# Patient Record
Sex: MALE | Race: WHITE | NOT HISPANIC OR LATINO | Employment: FULL TIME | URBAN - METROPOLITAN AREA
[De-identification: names, ages, dates, MRNs, and addresses within clinical notes are randomized per-mention and may not be internally consistent; named-entity substitution may affect disease eponyms.]

---

## 2018-02-15 ENCOUNTER — CLINICAL SUPPORT (OUTPATIENT)
Dept: OCCUPATIONAL MEDICINE | Facility: CLINIC | Age: 58
End: 2018-02-15

## 2018-02-15 DIAGNOSIS — Z02.1 PRE-EMPLOYMENT EXAMINATION: Primary | ICD-10-CM

## 2018-02-15 PROCEDURE — 97750 PHYSICAL PERFORMANCE TEST: CPT | Mod: S$GLB,,, | Performed by: NURSE PRACTITIONER

## 2018-02-15 PROCEDURE — 99499 UNLISTED E&M SERVICE: CPT | Mod: S$GLB,,, | Performed by: NURSE PRACTITIONER

## 2018-02-15 PROCEDURE — 95832 PR HAND MUSCLE TEST,MANUAL: CPT | Mod: S$GLB,,, | Performed by: NURSE PRACTITIONER

## 2018-02-15 PROCEDURE — 80305 DRUG TEST PRSMV DIR OPT OBS: CPT | Mod: S$GLB,,, | Performed by: NURSE PRACTITIONER

## 2018-05-16 ENCOUNTER — TELEPHONE (OUTPATIENT)
Dept: OTOLARYNGOLOGY | Facility: CLINIC | Age: 58
End: 2018-05-16

## 2018-05-16 NOTE — TELEPHONE ENCOUNTER
----- Message from Kamron Patton sent at 5/16/2018  9:33 AM CDT -----  Contact: patient            Name of Who is Calling: Willie      What is the request in detail: Established patient is requesting to be seen this Friday only for a sinus infection.  Patient was offered appointment this Thursday but will not be available any day except this Friday.      Can the clinic reply by [facility]: no      What Number to Call Back [facility]: [phone]

## 2018-05-17 ENCOUNTER — CLINICAL SUPPORT (OUTPATIENT)
Dept: OTHER | Facility: CLINIC | Age: 58
End: 2018-05-17
Payer: COMMERCIAL

## 2018-05-17 DIAGNOSIS — Z00.8 HEALTH EXAMINATION IN POPULATION SURVEYS: Primary | ICD-10-CM

## 2018-05-17 PROCEDURE — 82947 ASSAY GLUCOSE BLOOD QUANT: CPT | Mod: QW,S$GLB,, | Performed by: INTERNAL MEDICINE

## 2018-05-17 PROCEDURE — 80061 LIPID PANEL: CPT | Mod: QW,S$GLB,, | Performed by: INTERNAL MEDICINE

## 2018-05-17 PROCEDURE — 99401 PREV MED CNSL INDIV APPRX 15: CPT | Mod: S$GLB,,, | Performed by: INTERNAL MEDICINE

## 2018-05-18 ENCOUNTER — OFFICE VISIT (OUTPATIENT)
Dept: OTOLARYNGOLOGY | Facility: CLINIC | Age: 58
End: 2018-05-18
Payer: COMMERCIAL

## 2018-05-18 VITALS
BODY MASS INDEX: 34.21 KG/M2 | DIASTOLIC BLOOD PRESSURE: 79 MMHG | WEIGHT: 218 LBS | HEIGHT: 67 IN | SYSTOLIC BLOOD PRESSURE: 118 MMHG

## 2018-05-18 VITALS
WEIGHT: 217 LBS | SYSTOLIC BLOOD PRESSURE: 137 MMHG | HEIGHT: 67 IN | HEART RATE: 63 BPM | DIASTOLIC BLOOD PRESSURE: 83 MMHG | BODY MASS INDEX: 34.06 KG/M2 | TEMPERATURE: 98 F

## 2018-05-18 DIAGNOSIS — J34.2 NASAL SEPTAL DEVIATION: ICD-10-CM

## 2018-05-18 DIAGNOSIS — R05.9 COUGH: ICD-10-CM

## 2018-05-18 DIAGNOSIS — J30.9 ALLERGIC RHINITIS, UNSPECIFIED SEASONALITY, UNSPECIFIED TRIGGER: ICD-10-CM

## 2018-05-18 DIAGNOSIS — J01.91 ACUTE RECURRENT SINUSITIS, UNSPECIFIED LOCATION: Primary | ICD-10-CM

## 2018-05-18 DIAGNOSIS — R51.9 NONINTRACTABLE EPISODIC HEADACHE, UNSPECIFIED HEADACHE TYPE: ICD-10-CM

## 2018-05-18 LAB
GLUCOSE SERPL-MCNC: 83 MG/DL (ref 60–140)
POC CHOLESTEROL, HDL: 52 MG/DL (ref 40–?)
POC CHOLESTEROL, LDL: 110 MG/DL (ref ?–160)
POC CHOLESTEROL, TOTAL: 203 MG/DL (ref ?–240)
POC GLUCOSE FASTING: ABNORMAL MG/DL (ref 60–110)
POC TOTAL CHOLESTEROL / HDL RATIO: 3.9 (ref ?–6)
POC TRIGLYCERIDES: 205 MG/DL (ref ?–160)

## 2018-05-18 PROCEDURE — 99214 OFFICE O/P EST MOD 30 MIN: CPT | Mod: S$GLB,,, | Performed by: SPECIALIST

## 2018-05-18 PROCEDURE — 3008F BODY MASS INDEX DOCD: CPT | Mod: CPTII,S$GLB,, | Performed by: SPECIALIST

## 2018-05-18 RX ORDER — SIMVASTATIN 20 MG/1
20 TABLET, FILM COATED ORAL NIGHTLY
Refills: 0 | COMMUNITY
Start: 2018-05-04

## 2018-05-18 RX ORDER — CETIRIZINE HYDROCHLORIDE 10 MG/1
10 TABLET ORAL DAILY
Refills: 0 | COMMUNITY
Start: 2018-02-14

## 2018-05-18 RX ORDER — ESCITALOPRAM OXALATE 10 MG/1
10 TABLET ORAL DAILY
Refills: 0 | COMMUNITY
Start: 2018-03-07 | End: 2019-04-03

## 2018-05-18 RX ORDER — CEFDINIR 300 MG/1
CAPSULE ORAL
Qty: 28 CAPSULE | Refills: 0 | Status: SHIPPED | OUTPATIENT
Start: 2018-05-18 | End: 2019-04-03 | Stop reason: ALTCHOICE

## 2018-05-18 RX ORDER — MONTELUKAST SODIUM 10 MG/1
10 TABLET ORAL NIGHTLY
Qty: 30 TABLET | Refills: 11 | Status: SHIPPED | OUTPATIENT
Start: 2018-05-18 | End: 2018-06-17

## 2018-05-18 RX ORDER — DIAZEPAM 10 MG/1
10 TABLET ORAL
Refills: 1 | COMMUNITY
Start: 2018-05-03

## 2018-05-18 RX ORDER — ETODOLAC 400 MG/1
400 TABLET, FILM COATED ORAL 2 TIMES DAILY PRN
Refills: 0 | COMMUNITY
Start: 2018-02-21 | End: 2019-09-06 | Stop reason: CLARIF

## 2018-05-18 RX ORDER — BRIMONIDINE TARTRATE 2 MG/ML
SOLUTION/ DROPS OPHTHALMIC
Refills: 0 | COMMUNITY
Start: 2018-05-04 | End: 2021-03-23

## 2018-05-18 RX ORDER — AMLODIPINE AND BENAZEPRIL HYDROCHLORIDE 5; 10 MG/1; MG/1
1 CAPSULE ORAL DAILY
Refills: 0 | COMMUNITY
Start: 2018-03-07 | End: 2021-03-23

## 2018-05-18 NOTE — PATIENT INSTRUCTIONS

## 2018-05-20 PROBLEM — J01.91 ACUTE RECURRENT SINUSITIS: Status: ACTIVE | Noted: 2018-05-20

## 2018-05-20 PROBLEM — J30.9 ALLERGIC RHINITIS: Status: ACTIVE | Noted: 2018-05-20

## 2018-05-20 PROBLEM — J34.2 NASAL SEPTAL DEVIATION: Status: ACTIVE | Noted: 2018-05-20

## 2018-05-20 NOTE — PROGRESS NOTES
Subjective:       Patient ID: Willie Roach is a 58 y.o. male.    Chief Complaint: Sinus Problem    The patient is having episodes of recurring sinusitis at least 3 to times per year if not more often.  In the last year he has had 4-6 episodes.  They typically resolve with antibiotic therapy but then recur within 4-6 weeks.  His symptom complex (postnasal drip and cough which produces yellow sputum.  He then sees his primary care physician where he received an antibiotic and steroid injection and symptoms resolved initially.  They typically return in 2-3 weeks.  Last antibiotic the patient took was Augmentin.  He is using Flonase and Xyzal with incomplete relief of symptoms.      Review of Systems   Constitutional: Negative for activity change, appetite change, chills, fatigue, fever and unexpected weight change.   HENT: Positive for congestion, postnasal drip, rhinorrhea, sinus pain, sinus pressure and sore throat. Negative for ear discharge, ear pain, facial swelling, hearing loss, mouth sores, sneezing, tinnitus, trouble swallowing and voice change.    Eyes: Negative for photophobia, pain, discharge, redness, itching and visual disturbance.   Respiratory: Positive for cough. Negative for apnea, choking, shortness of breath and wheezing.    Cardiovascular: Negative for chest pain and palpitations.   Gastrointestinal: Negative for abdominal distention, abdominal pain, nausea and vomiting.   Musculoskeletal: Negative for arthralgias, myalgias, neck pain and neck stiffness.   Skin: Negative.  Negative for color change, pallor and rash.   Allergic/Immunologic: Negative for environmental allergies, food allergies and immunocompromised state.   Neurological: Positive for headaches. Negative for dizziness, facial asymmetry, speech difficulty, weakness, light-headedness and numbness.   Hematological: Negative for adenopathy. Does not bruise/bleed easily.   Psychiatric/Behavioral: Negative for agitation, confusion,  decreased concentration and sleep disturbance.       Objective:      Physical Exam   Constitutional: He is oriented to person, place, and time. He appears well-developed and well-nourished. He is cooperative.   HENT:   Head: Normocephalic.   Right Ear: External ear and ear canal normal. Tympanic membrane is retracted.   Left Ear: External ear and ear canal normal. Tympanic membrane is retracted.   Nose: Mucosal edema (cyanotic, boggy inferior turbinates bilaterally), rhinorrhea (clear mucus bilaterally) and septal deviation (to the right anteriorly and left posteriorly) present.   Mouth/Throat: Uvula is midline. No oral lesions. Posterior oropharyngeal erythema (mild) present. No oropharyngeal exudate (thin pus from the nasopharynx).   Eyes: EOM and lids are normal. Pupils are equal, round, and reactive to light. Right eye exhibits no discharge and no exudate. Left eye exhibits no discharge and no exudate. Right conjunctiva is injected. Left conjunctiva is injected.   Neck: Trachea normal and normal range of motion. No muscular tenderness present. No tracheal deviation present. No thyroid mass and no thyromegaly present.   Cardiovascular: Normal rate, regular rhythm, normal heart sounds and normal pulses.    Pulmonary/Chest: Effort normal. No stridor. He has decreased breath sounds. He has no wheezes. He has no rhonchi. He has no rales.   Abdominal: Soft. Bowel sounds are normal. There is no tenderness.   Musculoskeletal: Normal range of motion.   Lymphadenopathy:        Head (right side): No submental, no submandibular, no preauricular, no posterior auricular and no occipital adenopathy present.        Head (left side): No submental, no submandibular, no preauricular, no posterior auricular and no occipital adenopathy present.     He has no cervical adenopathy.   Neurological: He is alert and oriented to person, place, and time. He has normal strength. No cranial nerve deficit or sensory deficit. Gait normal.    Skin: Skin is warm and dry. No petechiae and no rash noted. No cyanosis. Nails show no clubbing.   Psychiatric: He has a normal mood and affect. His speech is normal and behavior is normal. Judgment and thought content normal. Cognition and memory are normal.       Assessment:       1. Acute recurrent sinusitis, unspecified location    2. Allergic rhinitis, unspecified seasonality, unspecified trigger    3. Nasal septal deviation    4. Cough    5. Nonintractable episodic headache, unspecified headache type        Plan:       I am adding a nighttime dose of Singulair to the patient's daily Xyzal and Flonase.  I'm placing him on a different antibiotic and will recheck him in 2 weeks.  When he is clinically clear from the acute sinusitis I will order a CT scan of sinuses.

## 2018-06-01 ENCOUNTER — TELEPHONE (OUTPATIENT)
Dept: OTOLARYNGOLOGY | Facility: CLINIC | Age: 58
End: 2018-06-01

## 2018-06-01 NOTE — TELEPHONE ENCOUNTER
Attempted to wolf pt today about his schedule appointment with Dr. Nash. Left pt message to call Dr. Nash office to reschedule appt.

## 2018-08-15 ENCOUNTER — OFFICE VISIT (OUTPATIENT)
Dept: OCCUPATIONAL MEDICINE | Facility: CLINIC | Age: 58
End: 2018-08-15
Payer: COMMERCIAL

## 2018-08-15 VITALS
BODY MASS INDEX: 35.36 KG/M2 | TEMPERATURE: 98 F | SYSTOLIC BLOOD PRESSURE: 130 MMHG | DIASTOLIC BLOOD PRESSURE: 82 MMHG | WEIGHT: 220 LBS | HEIGHT: 66 IN

## 2018-08-15 DIAGNOSIS — S61.431A PUNCTURE WOUND OF RIGHT HAND, FOREIGN BODY PRESENCE UNSPECIFIED, INITIAL ENCOUNTER: Primary | ICD-10-CM

## 2018-08-15 DIAGNOSIS — Z02.83 ENCOUNTER FOR DRUG SCREENING: ICD-10-CM

## 2018-08-15 LAB — POC BREATH ALCOHOL: NEGATIVE

## 2018-08-15 PROCEDURE — 99203 OFFICE O/P NEW LOW 30 MIN: CPT | Mod: 25,S$GLB,, | Performed by: PHYSICIAN ASSISTANT

## 2018-08-15 PROCEDURE — 80305 DRUG TEST PRSMV DIR OPT OBS: CPT | Mod: S$GLB,,, | Performed by: PREVENTIVE MEDICINE

## 2018-08-15 PROCEDURE — 82075 ASSAY OF BREATH ETHANOL: CPT | Mod: S$GLB,,, | Performed by: PHYSICIAN ASSISTANT

## 2018-08-15 PROCEDURE — 90714 TD VACC NO PRESV 7 YRS+ IM: CPT | Mod: S$GLB,,, | Performed by: PHYSICIAN ASSISTANT

## 2018-08-15 PROCEDURE — 90471 IMMUNIZATION ADMIN: CPT | Mod: S$GLB,,, | Performed by: PHYSICIAN ASSISTANT

## 2018-08-15 NOTE — PROGRESS NOTES
Subjective:       Patient ID: Willie Roach is a 58 y.o. male.    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  His TD is not up to date.  CT        Review of Systems   Constitution: Negative for chills and fever.   Respiratory: Negative for shortness of breath.    Skin: Negative for poor wound healing.   Musculoskeletal: Negative for joint pain, muscle cramps and muscle weakness.       Objective:      Physical Exam   Constitutional: He is oriented to person, place, and time. He appears well-developed and well-nourished.   HENT:   Head: Normocephalic and atraumatic. Head is without abrasion, without contusion and without laceration.   Right Ear: External ear normal.   Left Ear: External ear normal.   Nose: Nose normal.   Mouth/Throat: Oropharynx is clear and moist.   Eyes: Conjunctivae, EOM and lids are normal. Pupils are equal, round, and reactive to light.   Neck: Trachea normal, full passive range of motion without pain and phonation normal. Neck supple.   Cardiovascular: Normal rate, regular rhythm and normal heart sounds.   Pulmonary/Chest: Effort normal and breath sounds normal. No stridor. No respiratory distress.   Musculoskeletal: Normal range of motion.        Right hand: He exhibits normal range of motion, no tenderness, normal capillary refill and no swelling. Normal sensation noted. Normal strength noted.        Hands:  Neurological: He is alert and oriented to person, place, and time.   Skin: Skin is warm, dry and intact. Capillary refill takes less than 2 seconds. No abrasion, no bruising, no burn, no ecchymosis, no laceration, no lesion and no rash noted. No erythema.   Psychiatric: He has a normal mood and affect. His speech is normal and behavior is normal. Judgment and thought content normal. Cognition and memory are normal.   Nursing note and vitals reviewed.      Assessment:        1. Puncture wound of right hand, foreign body presence unspecified, initial encounter    2. Encounter for drug screening        Plan:       Td given today. Wound cleaned with hibiclens. I did not feel or observe fb in the wound.          Restrictions: Regular Duty, Discharged from Occupational Health  Follow-up if symptoms worsen or fail to improve.

## 2018-08-15 NOTE — LETTER
[facility] [address]  [place] [address]  Phone: [phone]  Fax: [phone]    Pt Name: Willie Roach  Injury Date: 08/15/2018   Employee ID:  Date of First Treatment: 08/15/2018   Company: [employer]            Appointment Time: 03:15 PM Arrived:  3:30 PM CDT   Appointment Date: [unfilled] Time Out:1630 PM    Physician: Willie Riley PA-C        Office Treatment: Willie was seen today for puncture wound.    Diagnoses and all orders for this visit:  EXAM  DRUG AND ALCOHOL SCREEN  DISCHARGED TO REGULAR DUTY     Puncture wound of right hand, foreign body presence unspecified, initial encounter  Encounter for drug screening  Other orders          Restrictions: Regular Duty, Discharged from Occupational Health       Return Appointment: NONE

## 2018-08-20 PROBLEM — J01.91 ACUTE RECURRENT SINUSITIS: Status: RESOLVED | Noted: 2018-05-20 | Resolved: 2018-08-20

## 2018-10-04 ENCOUNTER — OFFICE VISIT (OUTPATIENT)
Dept: URGENT CARE | Facility: CLINIC | Age: 58
End: 2018-10-04
Payer: COMMERCIAL

## 2018-10-04 DIAGNOSIS — S61.441D: Primary | ICD-10-CM

## 2018-10-04 PROCEDURE — 99213 OFFICE O/P EST LOW 20 MIN: CPT | Mod: S$GLB,,, | Performed by: PREVENTIVE MEDICINE

## 2018-10-04 RX ORDER — DOXYCYCLINE HYCLATE 100 MG
100 TABLET ORAL 2 TIMES DAILY
Qty: 14 TABLET | Refills: 0 | Status: SHIPPED | OUTPATIENT
Start: 2018-10-04 | End: 2019-04-03

## 2018-10-04 NOTE — PROGRESS NOTES
Subjective:       Patient ID: Willie Roach is a 58 y.o. male.    Chief Complaint: Hand Pain    Follow up for RIGHT HAND puncture wound from 08/15/18.  CT      Hand Pain    The incident occurred more than 1 week ago. The incident occurred at work. The pain is present in the right hand. The pain is at a severity of 1/10. The pain is mild.     Review of Systems   Musculoskeletal: Positive for muscle cramps.       Objective:      Physical Exam   Constitutional: He is oriented to person, place, and time. He appears well-developed and well-nourished.   HENT:   Head: Normocephalic.   Eyes: Pupils are equal, round, and reactive to light.   Neck: Normal range of motion.   Cardiovascular: Normal rate.   Pulmonary/Chest: Effort normal.   Musculoskeletal: Normal range of motion.   Neurological: He is alert and oriented to person, place, and time.   Skin: Skin is warm and dry.        Puncture wound right hand with embedded foreign body noted about the jordan aspect of right hand. No erythema or discharge noted. Pulses good. Full range of motion of all fingers and thumb with no pain.   Psychiatric: He has a normal mood and affect.   Nursing note and vitals reviewed.      Assessment:       1. Puncture wound of right hand with foreign body, subsequent encounter        Plan:         Medications Ordered This Encounter   Medications    doxycycline (VIBRA-TABS) 100 MG tablet     Sig: Take 1 tablet (100 mg total) by mouth 2 (two) times daily.     Dispense:  14 tablet     Refill:  0     Patient Instructions: Keep dressing clean/dry/covered   Restrictions: Regular Duty  Follow-up in about 5 days (around 10/9/2018).

## 2018-10-04 NOTE — LETTER
[facility] [address]  [place] [address]  Phone: [phone]  Fax: [phone]    Pt Name: Willie Roach  Injury Date: 08/15/2018   Employee ID:  Date of Treatment: 10/04/2018   Company: [employer]      Appointment Time: 1:30 PM Arrived: 1330 PM   Provider: Evans Herrera MD Time Out:1605 PM     Office Treatment:   EXAM  REGULAR DUTY    1. Puncture wound of right hand with foreign body, subsequent encounter      Medications Ordered This Encounter   Medications    doxycycline (VIBRA-TABS) 100 MG tablet      Patient Instructions: Keep dressing clean/dry/covered      Restrictions: Regular Duty     Return Appointment: 10/8/18 @ 0830 AM IN [facility]  [address]

## 2018-10-04 NOTE — PROCEDURES
"Wound Debridement  Date/Time: 10/4/2018 3:54 PM  Performed by: Evans Herrera MD  Authorized by: Evans Herrera MD     Time out: Immediately prior to procedure a "time out" was called to verify the correct patient, procedure, equipment, support staff and site/side marked as required.    Consent Done?:  Yes (Verbal)  Local anesthesia used?: Yes    Anesthesia:  Local infiltration  Local anesthetic:  Lidocaine 2% without epinephrine  Anesthetic total (ml):  3    Wound Details:    Location:  Right hand    Type of Debridement:  Excisional       Length (cm):  2       Area (sq cm):  2       Width (cm):  1       Depth (cm):  1    Depth of debridement:  Subcutaneous tissue    Debridement - 1st Wound - Devitalized Tissue Debrided: Foreign body removed.    Instruments:  Forceps, Scissors and Blade    Bleeding:  None  Patient tolerance:  Patient tolerated the procedure well with no immediate complications      "

## 2018-10-08 ENCOUNTER — OFFICE VISIT (OUTPATIENT)
Dept: URGENT CARE | Facility: CLINIC | Age: 58
End: 2018-10-08
Payer: COMMERCIAL

## 2018-10-08 DIAGNOSIS — S61.441D: Primary | ICD-10-CM

## 2018-10-08 PROCEDURE — 99213 OFFICE O/P EST LOW 20 MIN: CPT | Mod: S$GLB,,, | Performed by: PREVENTIVE MEDICINE

## 2018-10-08 NOTE — PROGRESS NOTES
Subjective:       Patient ID: Willie Roach is a 58 y.o. male.    Chief Complaint: Wound Check    Follow-up visit from (DOI 08/15/18) of Right Hand Puncture. Pain Level today is 1/10, yesterday Mr Roach left his Right Hand undressed and was squeezing the wound site when clear liquid came out. He is still on Doxycycline 100mg. [facility]      Wound Check       Review of Systems   Constitution: Negative for chills and fever.   HENT: Negative for sore throat.    Eyes: Negative for blurred vision.   Cardiovascular: Negative for chest pain.   Respiratory: Negative for shortness of breath.    Skin: Negative for rash.   Musculoskeletal: Negative for back pain and joint pain.   Gastrointestinal: Negative for abdominal pain, diarrhea, nausea and vomiting.   Neurological: Negative for headaches.   Psychiatric/Behavioral: The patient is not nervous/anxious.    All other systems reviewed and are negative.      Objective:      Physical Exam   Constitutional: He is oriented to person, place, and time. He appears well-developed and well-nourished.   HENT:   Head: Normocephalic.   Eyes: Pupils are equal, round, and reactive to light.   Neck: Normal range of motion.   Cardiovascular: Normal rate.   Pulmonary/Chest: Effort normal.   Musculoskeletal: Normal range of motion.   Neurological: He is alert and oriented to person, place, and time.   Skin: Skin is warm and dry.        Puncture wound right hand healing with no sign of infection or discharge. Patient has mild pain with palpation of wound site. Full range of motion of all fingers with no pain..    Psychiatric: He has a normal mood and affect.   Nursing note and vitals reviewed.      Assessment:       1. Puncture wound of right hand with foreign body, subsequent encounter        Plan:            Patient Instructions: Keep dressing clean/dry/covered(Finish all oral antibiotics prescribed. Clean wound site with soap and water daily. )   Restrictions: Regular Duty, Discharged from  Occupational Health  Follow-up if symptoms worsen or fail to improve.

## 2018-10-08 NOTE — LETTER
[facility]  [address], [address]  [facility] [address]  Phone: [phone]  Fax: [phone]    Pt Name: Willie Tinsley Date: 08/15/2018   Employee ID: [id number] Date: 10/08/2018   Company: [employer]      Appointment Time: 8:30 AM Arrived: 8:32 AM   Provider: Evans Herrera MD Time Out: 9:25 AM     Office Treatment:   1. Puncture wound of right hand with foreign body, subsequent encounter          Patient Instructions: Keep dressing clean/dry/covered(Finish all oral antibiotics prescribed. Clean wound site with soap and water daily. )      Restrictions: NONE  Regular Duty   Patient is discharged from Occupational Health         [facility]

## 2019-03-11 DIAGNOSIS — S32.9XXA: Primary | ICD-10-CM

## 2019-03-14 DIAGNOSIS — J32.9 CHRONIC INFECTION OF SINUS: Primary | ICD-10-CM

## 2019-03-18 ENCOUNTER — HOSPITAL ENCOUNTER (OUTPATIENT)
Dept: RADIOLOGY | Facility: HOSPITAL | Age: 59
Discharge: HOME | End: 2019-03-18
Attending: OTOLARYNGOLOGY
Payer: COMMERCIAL

## 2019-03-18 DIAGNOSIS — J32.9 CHRONIC INFECTION OF SINUS: ICD-10-CM

## 2019-03-18 PROCEDURE — 70486 CT MAXILLOFACIAL W/O DYE: CPT | Mod: 26,,, | Performed by: RADIOLOGY

## 2019-03-18 PROCEDURE — 70486 CT SINUSES WITHOUT CONTRAST: ICD-10-PCS | Mod: 26,,, | Performed by: RADIOLOGY

## 2019-03-18 PROCEDURE — 70486 CT MAXILLOFACIAL W/O DYE: CPT | Mod: TC

## 2019-03-27 ENCOUNTER — OFFICE VISIT (OUTPATIENT)
Dept: PODIATRY | Facility: CLINIC | Age: 59
End: 2019-03-27
Payer: COMMERCIAL

## 2019-03-27 VITALS
HEIGHT: 66 IN | SYSTOLIC BLOOD PRESSURE: 120 MMHG | WEIGHT: 220 LBS | HEART RATE: 73 BPM | BODY MASS INDEX: 35.36 KG/M2 | DIASTOLIC BLOOD PRESSURE: 79 MMHG

## 2019-03-27 DIAGNOSIS — M79.671 FOOT PAIN, RIGHT: ICD-10-CM

## 2019-03-27 DIAGNOSIS — M20.11 HALLUX VALGUS OF RIGHT FOOT: Primary | ICD-10-CM

## 2019-03-27 PROCEDURE — 99999 PR PBB SHADOW E&M-EST. PATIENT-LVL III: CPT | Mod: PBBFAC,,, | Performed by: PODIATRIST

## 2019-03-27 PROCEDURE — 99999 PR PBB SHADOW E&M-EST. PATIENT-LVL III: ICD-10-PCS | Mod: PBBFAC,,, | Performed by: PODIATRIST

## 2019-03-27 PROCEDURE — 99203 PR OFFICE/OUTPT VISIT, NEW, LEVL III, 30-44 MIN: ICD-10-PCS | Mod: S$GLB,,, | Performed by: PODIATRIST

## 2019-03-27 PROCEDURE — 3008F BODY MASS INDEX DOCD: CPT | Mod: CPTII,S$GLB,, | Performed by: PODIATRIST

## 2019-03-27 PROCEDURE — 99203 OFFICE O/P NEW LOW 30 MIN: CPT | Mod: S$GLB,,, | Performed by: PODIATRIST

## 2019-03-27 PROCEDURE — 3008F PR BODY MASS INDEX (BMI) DOCUMENTED: ICD-10-PCS | Mod: CPTII,S$GLB,, | Performed by: PODIATRIST

## 2019-03-27 NOTE — LETTER
March 30, 2019      Lucretia Kinney Mai, MD  [address]  [place] [facility] [address]           [facility]  [address]  [place] [address]  Phone: [phone]          Patient: Willie Roach   MR Number: [id number]   YOB: 1960   Date of Visit: 3/27/2019       Dear Dr. Lucretia Kinney Mai:    Thank you for referring Willie Roach to me for evaluation. Attached you will find relevant portions of my assessment and plan of care.    If you have questions, please do not hesitate to call me. I look forward to following Willie Roach along with you.    Sincerely,    Tyler Perez, VANNESSA    Enclosure  CC:  No Recipients    If you would like to receive this communication electronically, please contact [email or web address] or [phone] to request more information on [employer] access.    For providers and/or their staff who would like to refer a patient to [facility], please contact us through our one-stop-shop provider referral line, [facility] , at [phone].    If you feel you have received this communication in error or would no longer like to receive these types of communications, please e-mail [email or web address]

## 2019-03-28 ENCOUNTER — TELEPHONE (OUTPATIENT)
Dept: PODIATRY | Facility: CLINIC | Age: 59
End: 2019-03-28

## 2019-03-28 NOTE — TELEPHONE ENCOUNTER
----- Message from Latia Hernandez sent at 3/28/2019  2:52 PM CDT -----  Contact: patient   Please call pt at [phone]    Patient is calling to get the date of his future procedure    Thank you

## 2019-03-30 NOTE — PROGRESS NOTES
Subjective:      Patient ID: Willie Roach is a 58 y.o. male. Pre-operative H&P.    Chief Complaint: Bunions (rt foot bunion surgery consult for surgery)    Willie is a 58 y.o. male who presents to the podiatry clinic  with complaint of  right foot pain. Onset of the symptoms was several years ago. Precipitating event: none known. Current symptoms include: ability to bear weight, but with some pain. Aggravating factors: walking. Symptoms have gradually worsened. Patient has had no prior foot problems. Evaluation to date: none. Treatment to date: avoidance of offending activity. Patients rates pain 8/10 on pain scale.        Review of Systems   Constitution: Negative for chills and fever.   HENT: Negative for congestion and tinnitus.    Eyes: Negative for double vision and visual disturbance.   Cardiovascular: Negative for chest pain and claudication.   Respiratory: Negative for hemoptysis and shortness of breath.    Endocrine: Negative for cold intolerance and heat intolerance.   Hematologic/Lymphatic: Negative for adenopathy and bleeding problem.   Skin: Negative for color change and nail changes.   Musculoskeletal: Negative for myalgias and stiffness.   Gastrointestinal: Negative for nausea and vomiting.   Genitourinary: Negative for dysuria and hematuria.   Neurological: Negative for numbness and paresthesias.   Psychiatric/Behavioral: Negative for altered mental status and suicidal ideas.   Allergic/Immunologic: Negative for environmental allergies and persistent infections.           Objective:      Physical Exam   Constitutional: He is oriented to person, place, and time. Vital signs are normal. He appears well-developed and well-nourished.   Cardiovascular:   Pulses:       Dorsalis pedis pulses are 2+ on the right side, and 2+ on the left side.        Posterior tibial pulses are 2+ on the right side, and 2+ on the left side.   Musculoskeletal:        Right foot: There is normal range of motion and no  deformity.        Left foot: There is normal range of motion and no deformity.   Inspection and palpation of the muscles joints and bones of both lower extremities reveal that muscle strength for the anterior, lateral, and posterior muscle groups and intrinsic muscle groups of the foot are all 5 over 5 symmetrical. Ankle, subtalar, midtarsal, and digital joint range of motion  are within normal limits, nonpainful, without crepitus or effusion. Patient exhibits a normal angle and base of gait. Palpation of the tendons reveal no defects.  Bunion deformity noted right foot with mild to moderate hypermobility at the 1st metatarsocuneiform joint as well as distal deformity of the 1st metatarsophalangeal joint. Tenderness with palpation medially as well as with range of motion.     Feet:   Right Foot:   Skin Integrity: Negative for skin breakdown or erythema.   Left Foot:   Skin Integrity: Negative for skin breakdown or erythema.   Neurological: He is oriented to person, place, and time. He has normal strength. No sensory deficit.   Reflex Scores:       Patellar reflexes are 2+ on the right side and 2+ on the left side.       Achilles reflexes are 2+ on the right side and 2+ on the left side.  Sharp, dull, light touch, vibratory, and proprioceptive sensation are intact bilaterally. Deep tendon reflexes to patellar and Achilles tendon are symmetrical, 2/4 bilaterally. No ankle clonus or Babinski reflexes noted bilaterally. Coordination is normal to both feet and lower extremities.   Skin: Skin is warm, dry and intact. No cyanosis. Nails show no clubbing.   Skin turgor is normal bilaterally. Skin texture is well hydrated to both lower extremities. No lesions or rashes or wounds appreciated bilaterally. Nail plates 1 through 5 bilaterally are within normal limits for length and thickness. No nail clubbing or incurvation noted.             Assessment:       Encounter Diagnosis   Name Primary?    Hallux valgus of right foot  Yes         Plan:       Willie was seen today for bunions.    Diagnoses and all orders for this visit:    Hallux valgus of right foot      I counseled the patient on his conditions, their implications and medical management.      The patient has decided to forego any further conservative treatment and opted for surgical intervention.  Alternative treatments, and benefits of surgery were discussed with the patient.  Risks and complications, including but not limited to: pain, swelling, numbness, infection, failure to achieve the stated goals, recurrence of problem, nerve and blood vessel damage, scar tissue formation, further need of surgery, loss of limb or life, was discussed in detail with the patient.  All questions asked were answered, and written informed consent was sign and received. The patient will undergo bunionectomy right foot consisting of possible Lapidus procedure/combination of proximal and distal osteotomies with Hiro.      .

## 2019-04-03 ENCOUNTER — ANESTHESIA EVENT (OUTPATIENT)
Dept: SURGERY | Facility: HOSPITAL | Age: 59
End: 2019-04-03
Payer: COMMERCIAL

## 2019-04-03 RX ORDER — FLAXSEED OIL 1000 MG
1 CAPSULE ORAL DAILY
COMMUNITY
End: 2021-03-24

## 2019-04-03 RX ORDER — UBIDECARENONE 30 MG
30 CAPSULE ORAL 2 TIMES DAILY
COMMUNITY
End: 2021-03-23

## 2019-04-03 NOTE — PRE-PROCEDURE INSTRUCTIONS
Preop instructions: NPO solids/ milk products after midnight and clears up to 2 hours before arrival (clear liquids are: water, apple juice, Gatorade & Jell-O, black coffee/no milk, cream or creamer), shower instructions, directions, leave all valuables at home, medication instructions for PM prior & am of procedure explained. Patient stated an understanding.   Patient denies any side effects or issues with anesthesia or sedation.   Patient does not know arrival time. Explained that this information comes from the surgeons office and if they have not heard from them by 3pm, to call office. Patient stated an understanding.

## 2019-04-03 NOTE — ANESTHESIA PREPROCEDURE EVALUATION
04/03/2019  Willie Roach is a 58 y.o., male.    Pre-operative evaluation for Procedure(s) (LRB):  BUNIONECTOMY (Right)    Willie Roach is a 58 y.o. male     Patient Active Problem List   Diagnosis    Allergic rhinitis    Nasal septal deviation       Review of patient's allergies indicates:   Allergen Reactions    Codeine Hives and Itching    Ginger Rash       No current facility-administered medications on file prior to encounter.      Current Outpatient Medications on File Prior to Encounter   Medication Sig Dispense Refill    amlodipine-benazepril 5-10 mg (LOTREL) 5-10 mg per capsule Take 1 capsule by mouth once daily.  0    B-complex with vitamin C (SUPER B COMPLEX-VITAMIN C ORAL) Take 1 tablet by mouth once daily.      cetirizine (ZYRTEC) 10 MG tablet Take 10 mg by mouth once daily.  0    co-enzyme Q-10 30 mg capsule Take 30 mg by mouth 2 (two) times daily.      flaxseed oil 1,000 mg Cap Take 1 capsule by mouth once daily.      simvastatin (ZOCOR) 20 MG tablet Take 20 mg by mouth every evening.  0    vit A/vit C/vit E/zinc/copper (PRESERVISION AREDS ORAL) Take by mouth once daily.      brimonidine 0.2% (ALPHAGAN) 0.2 % Drop INSTILL ONE DROP INTO EACH EYE TWICE A DAY  0    diazePAM (VALIUM) 10 MG Tab Take 10 mg by mouth nightly.  1    etodolac (LODINE) 400 MG tablet Take 400 mg by mouth 2 (two) times daily as needed.  0       Past Surgical History:   Procedure Laterality Date    BACK SURGERY      CARPAL TUNNEL RELEASE      ELBOW SURGERY      EYE SURGERY      KNEE ARTHROSCOPY         Social History     Socioeconomic History    Marital status:      Spouse name: Not on file    Number of children: Not on file    Years of education: Not on file    Highest education level: Not on file   Occupational History    Not on file   Social Needs    Financial resource strain: Not  on file    Food insecurity:     Worry: Not on file     Inability: Not on file    Transportation needs:     Medical: Not on file     Non-medical: Not on file   Tobacco Use    Smoking status: Not on file   Substance and Sexual Activity    Alcohol use: Not on file    Drug use: Not on file    Sexual activity: Not on file   Lifestyle    Physical activity:     Days per week: Not on file     Minutes per session: Not on file    Stress: Not on file   Relationships    Social connections:     Talks on phone: Not on file     Gets together: Not on file     Attends [employer] service: Not on file     Active member of club or organization: Not on file     Attends meetings of clubs or organizations: Not on file     Relationship status: Not on file   Other Topics Concern    Not on file   Social History Narrative    Not on file       Anesthesia Evaluation    I have reviewed the Patient Summary Reports.    I have reviewed the Nursing Notes.   I have reviewed the Medications.     Review of Systems  Anesthesia Hx:  No problems with previous Anesthesia    Cardiovascular:   Exercise tolerance: good Hypertension    Hepatic/GI:   Denies GERD.        Physical Exam  General:  Well nourished    Airway/Jaw/Neck:  Airway Findings: Mouth Opening: Normal Tongue: Normal  General Airway Assessment: Adult  Mallampati: II  Improves to II with phonation.  TM Distance: Normal, at least 6 cm      Dental:  Dental Findings:    Chest/Lungs:  Chest/Lungs Findings: Clear to auscultation, Normal Respiratory Rate     Heart/Vascular:  Heart Findings: Rate: Normal  Rhythm: Regular Rhythm        Mental Status:  Mental Status Findings:  Cooperative, Alert and Oriented         Anesthesia Plan  Type of Anesthesia, risks & benefits discussed:  Anesthesia Type:  general  Patient's Preference:   Intra-op Monitoring Plan: standard ASA monitors  Intra-op Monitoring Plan Comments:   Post Op Pain Control Plan: IV/PO Opioids PRN  Post Op Pain Control Plan Comments:    Induction:   IV  Beta Blocker:  Patient is not currently on a Beta-Blocker (No further documentation required).       Informed Consent: Patient understands risks and agrees with Anesthesia plan.  Questions answered. Anesthesia consent signed with patient.  ASA Score: 2     Day of Surgery Review of History & Physical:    H&P update referred to the provider.         Ready For Surgery From Anesthesia Perspective.

## 2019-04-04 ENCOUNTER — ANESTHESIA (OUTPATIENT)
Dept: SURGERY | Facility: HOSPITAL | Age: 59
End: 2019-04-04
Payer: COMMERCIAL

## 2019-04-04 ENCOUNTER — HOSPITAL ENCOUNTER (OUTPATIENT)
Facility: HOSPITAL | Age: 59
Discharge: HOME | End: 2019-04-04
Attending: PODIATRIST | Admitting: PODIATRIST
Payer: COMMERCIAL

## 2019-04-04 VITALS
TEMPERATURE: 98 F | WEIGHT: 220 LBS | OXYGEN SATURATION: 98 % | BODY MASS INDEX: 34.53 KG/M2 | HEIGHT: 67 IN | DIASTOLIC BLOOD PRESSURE: 70 MMHG | SYSTOLIC BLOOD PRESSURE: 126 MMHG | RESPIRATION RATE: 12 BRPM | HEART RATE: 76 BPM

## 2019-04-04 DIAGNOSIS — M65.171 OTHER INFECTIVE (TENO)SYNOVITIS, RIGHT ANKLE AND FOOT: ICD-10-CM

## 2019-04-04 DIAGNOSIS — M21.619 BUNION: ICD-10-CM

## 2019-04-04 DIAGNOSIS — G57.81 NERVE ENTRAPMENT OF LOWER LIMB, RIGHT: ICD-10-CM

## 2019-04-04 PROCEDURE — C1769 GUIDE WIRE: HCPCS | Performed by: PODIATRIST

## 2019-04-04 PROCEDURE — S0020 INJECTION, BUPIVICAINE HYDRO: HCPCS | Performed by: PODIATRIST

## 2019-04-04 PROCEDURE — D9220A PRA ANESTHESIA: Mod: CRNA,,, | Performed by: NURSE ANESTHETIST, CERTIFIED REGISTERED

## 2019-04-04 PROCEDURE — 28296 COR HLX VLGS DSTL MTAR OSTEO: CPT | Mod: T5,,, | Performed by: PODIATRIST

## 2019-04-04 PROCEDURE — 63600175 PHARM REV CODE 636 W HCPCS: Performed by: NURSE ANESTHETIST, CERTIFIED REGISTERED

## 2019-04-04 PROCEDURE — 36000707: Performed by: PODIATRIST

## 2019-04-04 PROCEDURE — 27201423 OPTIME MED/SURG SUP & DEVICES STERILE SUPPLY: Performed by: PODIATRIST

## 2019-04-04 PROCEDURE — D9220A PRA ANESTHESIA: Mod: ANES,,, | Performed by: ANESTHESIOLOGY

## 2019-04-04 PROCEDURE — 37000009 HC ANESTHESIA EA ADD 15 MINS: Performed by: PODIATRIST

## 2019-04-04 PROCEDURE — D9220A PRA ANESTHESIA: ICD-10-PCS | Mod: ANES,,, | Performed by: ANESTHESIOLOGY

## 2019-04-04 PROCEDURE — 28296 PR CORRECT BUNION, DISTAL METATARSAL OSTEOTOMY: ICD-10-PCS | Mod: T5,,, | Performed by: PODIATRIST

## 2019-04-04 PROCEDURE — 36000706: Performed by: PODIATRIST

## 2019-04-04 PROCEDURE — D9220A PRA ANESTHESIA: ICD-10-PCS | Mod: CRNA,,, | Performed by: NURSE ANESTHETIST, CERTIFIED REGISTERED

## 2019-04-04 PROCEDURE — 25000003 PHARM REV CODE 250: Performed by: PODIATRIST

## 2019-04-04 PROCEDURE — 71000015 HC POSTOP RECOV 1ST HR: Performed by: PODIATRIST

## 2019-04-04 PROCEDURE — 37000008 HC ANESTHESIA 1ST 15 MINUTES: Performed by: PODIATRIST

## 2019-04-04 PROCEDURE — 71000016 HC POSTOP RECOV ADDL HR: Performed by: PODIATRIST

## 2019-04-04 PROCEDURE — 63600175 PHARM REV CODE 636 W HCPCS: Performed by: ANESTHESIOLOGY

## 2019-04-04 PROCEDURE — 64708 REVISE ARM/LEG NERVE: CPT | Mod: 51,RT,, | Performed by: PODIATRIST

## 2019-04-04 PROCEDURE — C1713 ANCHOR/SCREW BN/BN,TIS/BN: HCPCS | Performed by: PODIATRIST

## 2019-04-04 PROCEDURE — 71000044 HC DOSC ROUTINE RECOVERY FIRST HOUR: Performed by: PODIATRIST

## 2019-04-04 PROCEDURE — 64708 PR NEUROPLASTY OTHER ARM/LEG NERVE,OPEN: ICD-10-PCS | Mod: 51,RT,, | Performed by: PODIATRIST

## 2019-04-04 RX ORDER — BUPIVACAINE HYDROCHLORIDE 5 MG/ML
INJECTION, SOLUTION EPIDURAL; INTRACAUDAL
Status: DISCONTINUED | OUTPATIENT
Start: 2019-04-04 | End: 2019-04-04 | Stop reason: HOSPADM

## 2019-04-04 RX ORDER — ONDANSETRON 2 MG/ML
4 INJECTION INTRAMUSCULAR; INTRAVENOUS DAILY PRN
Status: DISCONTINUED | OUTPATIENT
Start: 2019-04-04 | End: 2019-04-04 | Stop reason: HOSPADM

## 2019-04-04 RX ORDER — LIDOCAINE HYDROCHLORIDE 10 MG/ML
1 INJECTION, SOLUTION EPIDURAL; INFILTRATION; INTRACAUDAL; PERINEURAL ONCE
Status: DISCONTINUED | OUTPATIENT
Start: 2019-04-04 | End: 2019-04-04 | Stop reason: HOSPADM

## 2019-04-04 RX ORDER — SODIUM CHLORIDE 0.9 % (FLUSH) 0.9 %
3 SYRINGE (ML) INJECTION
Status: DISCONTINUED | OUTPATIENT
Start: 2019-04-04 | End: 2019-04-04 | Stop reason: HOSPADM

## 2019-04-04 RX ORDER — MEPERIDINE HYDROCHLORIDE 50 MG/1
50 TABLET ORAL EVERY 4 HOURS PRN
Qty: 30 TABLET | Refills: 0 | Status: SHIPPED | OUTPATIENT
Start: 2019-04-04 | End: 2019-04-29 | Stop reason: SDUPTHER

## 2019-04-04 RX ORDER — LIDOCAINE HYDROCHLORIDE 10 MG/ML
INJECTION, SOLUTION EPIDURAL; INFILTRATION; INTRACAUDAL; PERINEURAL
Status: DISCONTINUED | OUTPATIENT
Start: 2019-04-04 | End: 2019-04-04 | Stop reason: HOSPADM

## 2019-04-04 RX ORDER — FENTANYL CITRATE 50 UG/ML
25 INJECTION, SOLUTION INTRAMUSCULAR; INTRAVENOUS EVERY 5 MIN PRN
Status: DISCONTINUED | OUTPATIENT
Start: 2019-04-04 | End: 2019-04-04 | Stop reason: HOSPADM

## 2019-04-04 RX ORDER — FENTANYL CITRATE 50 UG/ML
INJECTION, SOLUTION INTRAMUSCULAR; INTRAVENOUS
Status: DISCONTINUED | OUTPATIENT
Start: 2019-04-04 | End: 2019-04-04

## 2019-04-04 RX ORDER — DEXAMETHASONE SODIUM PHOSPHATE 4 MG/ML
INJECTION, SOLUTION INTRA-ARTICULAR; INTRALESIONAL; INTRAMUSCULAR; INTRAVENOUS; SOFT TISSUE
Status: DISCONTINUED | OUTPATIENT
Start: 2019-04-04 | End: 2019-04-04

## 2019-04-04 RX ORDER — ONDANSETRON 2 MG/ML
INJECTION INTRAMUSCULAR; INTRAVENOUS
Status: DISCONTINUED | OUTPATIENT
Start: 2019-04-04 | End: 2019-04-04

## 2019-04-04 RX ORDER — MIDAZOLAM HYDROCHLORIDE 1 MG/ML
INJECTION, SOLUTION INTRAMUSCULAR; INTRAVENOUS
Status: DISCONTINUED | OUTPATIENT
Start: 2019-04-04 | End: 2019-04-04

## 2019-04-04 RX ORDER — MEPERIDINE HYDROCHLORIDE 50 MG/1
50 TABLET ORAL EVERY 6 HOURS PRN
Qty: 30 TABLET | Refills: 0 | Status: SHIPPED | OUTPATIENT
Start: 2019-04-04 | End: 2019-04-04 | Stop reason: CLARIF

## 2019-04-04 RX ORDER — PROPOFOL 10 MG/ML
VIAL (ML) INTRAVENOUS
Status: DISCONTINUED | OUTPATIENT
Start: 2019-04-04 | End: 2019-04-04

## 2019-04-04 RX ORDER — SODIUM CHLORIDE 9 MG/ML
INJECTION, SOLUTION INTRAVENOUS CONTINUOUS
Status: DISCONTINUED | OUTPATIENT
Start: 2019-04-04 | End: 2019-04-04 | Stop reason: HOSPADM

## 2019-04-04 RX ADMIN — FENTANYL CITRATE 25 MCG: 50 INJECTION, SOLUTION INTRAMUSCULAR; INTRAVENOUS at 07:04

## 2019-04-04 RX ADMIN — FENTANYL CITRATE 25 MCG: 50 INJECTION INTRAMUSCULAR; INTRAVENOUS at 10:04

## 2019-04-04 RX ADMIN — MIDAZOLAM HYDROCHLORIDE 2 MG: 1 INJECTION, SOLUTION INTRAMUSCULAR; INTRAVENOUS at 06:04

## 2019-04-04 RX ADMIN — ONDANSETRON 4 MG: 2 INJECTION INTRAMUSCULAR; INTRAVENOUS at 07:04

## 2019-04-04 RX ADMIN — MIDAZOLAM HYDROCHLORIDE 2 MG: 1 INJECTION, SOLUTION INTRAMUSCULAR; INTRAVENOUS at 07:04

## 2019-04-04 RX ADMIN — DEXAMETHASONE SODIUM PHOSPHATE 4 MG: 4 INJECTION, SOLUTION INTRAMUSCULAR; INTRAVENOUS at 07:04

## 2019-04-04 RX ADMIN — PROPOFOL 60 MG: 10 INJECTION, EMULSION INTRAVENOUS at 07:04

## 2019-04-04 RX ADMIN — SODIUM CHLORIDE: 0.9 INJECTION, SOLUTION INTRAVENOUS at 06:04

## 2019-04-04 RX ADMIN — PROPOFOL 20 MG: 10 INJECTION, EMULSION INTRAVENOUS at 07:04

## 2019-04-04 NOTE — OP NOTE
Operative Note       Surgery Date: 4/4/2019     Surgeon(s) and Role:     * Tyler Perez DPM - Primary     * Mario Groves MD - Resident - Assisting    Pre-op Diagnosis:  Hallux valgus of right foot [M20.11]    Post-op Diagnosis: Post-Op Diagnosis Codes:     * Hallux valgus of right foot [M20.11]      Synovitis right Extensor Hallucis Longus Tendon    Procedure(s) (LRB):  BUNIONECTOMY (Right)    Anesthesia: Local MAC    Procedure in Detail/Findings:  The patient was brought to the operating room on a stretcher and placed on the operating table in a supine position. Following the successful induction of MAC anesthesia an ankle tourniquet was placed on the right ankle.  When MAC anesthesia was achieved a block was given using 15ml of 1% lidocaine plain and 0.5% Bupivicain plain.  The right foot was prepped and draped in a sterile manner. Tourniquet was inflated to 250mmHg.     A longitudinal incision was made over the medial aspect of the 1st MP joint. The incision was then deepened through the subcutaneous tissue, using sharp and blunt dissection. Care was taken to identify and retract all vital neuro and vascular structures.  Soft tissue dissection yielded visualization of the 1st MPJ capsule and a capsulotomy was performed over the medial aspect of the 1st MPJ. All capsular structures were sharply freed from about the head of the dorsal and medial aspect of the 1st Metatarsal. The chondral surface of the first metatarsal head was intact throughout. A partal lateral release was completed from the plantar aspect of the first metatarsal head releasing the fibular sesamoidal ligament and a lateral capsular release was performed from the dorsal aspect of the metatarsal head.     The medial eminence was then resected with a sagittal saw preserving the medial sagittal groove. A k-wire was inserted just dorsal to the geometric center of the first metatarsal head serving as an axis guide.  An Khalif osteotomy was  performed from medial to lateral with apex distal. The capital fragment was then translated laterally reducing the intermetatarsal angle and HV angle. Guide wire for the cannulated screws were inserted for temporary fixation. Permanent fixation was accomplished using standard AO technique to place 3.0 headless cannulated screw.  Sagittal saw was used to smooth edges of the metatarsal head. Synovitic tissue was noted about the Ehl tyendon as well as the joint and this was excised sharply.    The wound was then flushed with sterile saline solution and the wound was closed using 4-0 vicryl and monocryl, and 4-0 nylon.  Tourniquet was let down.  The incision site was dressed with Xeroform, 4x4's, clover, and ace, and patient was placed in a Cam boot.    The patient was transferred to the recovery room with vital signs stable. Following a period of post op monitoring, the patient will be Discharged home  with the following post op instructions:   1. Keep dressing dry and intact until Podiatry follow up.   2.Weight bearing status: minimal weight bearing in Cam boot  3. All necessary prescriptions ordered and medical management will continue.   4. Contact Podiatry for all post op follow up care and if any problems arise.        Estimated Blood Loss: less than 5mL         Specimens (From admission, onward)    None        Implants:   Implant Name Type Inv. Item Serial No.  Lot No. LRB No. Used   3.0 headless screw      Right 1              Disposition: PACU - hemodynamically stable.           Condition: Good    Attestation:  I was present and scrubbed for the entire procedure.           Discharge Note    Admit Date: 4/4/2019    Attending Physician: Tyler Perez DPM     Discharge Physician: Tyler Perez DPM    Final Diagnosis: Post-Op Diagnosis Codes:     * Hallux valgus of right foot [M20.11]    Disposition: Home or Self Care    Patient Instructions:   Current Discharge Medication List      CONTINUE  "these medications which have NOT CHANGED    Details   amlodipine-benazepril 5-10 mg (LOTREL) 5-10 mg per capsule Take 1 capsule by mouth once daily.  Refills: 0      B-complex with vitamin C (SUPER B COMPLEX-VITAMIN C ORAL) Take 1 tablet by mouth once daily.      cetirizine (ZYRTEC) 10 MG tablet Take 10 mg by mouth once daily.  Refills: 0      co-enzyme Q-10 30 mg capsule Take 30 mg by mouth 2 (two) times daily.      diazePAM (VALIUM) 10 MG Tab Take 10 mg by mouth nightly.  Refills: 1      etodolac (LODINE) 400 MG tablet Take 400 mg by mouth 2 (two) times daily as needed.  Refills: 0      flaxseed oil 1,000 mg Cap Take 1 capsule by mouth once daily.      simvastatin (ZOCOR) 20 MG tablet Take 20 mg by mouth every evening.  Refills: 0      vit A/vit C/vit E/zinc/copper (PRESERVISION AREDS ORAL) Take by mouth once daily.      brimonidine 0.2% (ALPHAGAN) 0.2 % Drop INSTILL ONE DROP INTO EACH EYE TWICE A DAY  Refills: 0             Discharge Procedure Orders (must include Diet, Follow-up, Activity)   Discharge Procedure Orders (must include Diet, Follow-up, Activity)   HME - OTHER     Order Specific Question Answer Comments   Type of Equipment: knee scooter    Height: 5' 7" (1.702 m)    Weight: 99.8 kg (220 lb)    Vendor: [facility]    Expected Date of Delivery: 4/4/2019      Diet general     Call MD for:  extreme fatigue     Call MD for:  persistent dizziness or light-headedness     Call MD for:  hives     Call MD for:  redness, tenderness, or signs of infection (pain, swelling, redness, odor or green/yellow discharge around incision site)     Call MD for:  difficulty breathing, headache or visual disturbances     Call MD for:  severe uncontrolled pain     Call MD for:  persistent nausea and vomiting     Call MD for:  temperature >100.4     Leave dressing on - Keep it clean, dry, and intact until clinic visit     Weight bearing restrictions (specify)   Order Comments: Minimal weight bearing to right foot with short cam " boot        Discharge Date: No discharge date for patient encounter.

## 2019-04-04 NOTE — PROGRESS NOTES
Notified Dr. LUIS Ruiz that patient is complaining of pain and patient had nothing ordered for pain. Dr. Belkys Ruiz stated that she would place order for pain medicine. Will continue to monitor patient.

## 2019-04-04 NOTE — DISCHARGE INSTRUCTIONS
Osteotomy and Ligament or Tendon Repair (Bunion Surgery)  Osteotomy and ligament or tendon repair is a type of bunion surgery. A bunion is a bony bump (growth) at the base of your big toe. This growth can form when your big toe pushes against your next toe. A bunion can cause pain, swelling, redness, and other symptoms. During this surgery, bone is removed from your toe. Nearby tendons and ligaments are made shorter or longer as needed. This allows your big toe to line up (align) properly.    Preparing for surgery  Follow any instructions from your healthcare provider.  Tell your surgeon about any medicines you are taking. You may need to stop taking all or some of these before the procedure. This includes:  · All prescription medicines  · Over-the-counter medicines such as aspirin or ibuprofen  · Street drugs  · Herbs, vitamins, and other supplements  Follow any directions you are given for not eating or drinking before surgery.  The day of surgery  The surgery takes about 60 minutes. You will likely go home the same day.  Before the surgery begins  · An IV (intravenous) line is put into a vein in your arm or hand. This line gives you fluids and medicines.  · You may be given medicine to help you relax (sedation). To keep you free of pain during the surgery, you may have medicine to block the nerves in your foot. Or, you will be given general anesthesia. This puts you into a deep sleep.  During the surgery  · A cut (incision) is made on your foot to expose the bunion bump, and the tendons and ligaments around it. The tendons and ligaments that are tight are cut (released).  · The bunion bump is removed with a bone saw. Your big toe bone or the main bone in your foot is shortened and realigned. A pin, screw, or plate is used to hold your toe and foot bones together.  · The nearby tendons and ligaments may be tightened. If there is extra tissue, it is removed and the ends are stitched (sutured) together. The  incision in your skin is then closed with sutures. Your foot is bandaged.  After the surgery  Youll be taken to a recovery room. You may have medicines to manage pain. You may wear a brace, surgical shoe, or cast to protect your foot while it heals. The surgeon will tell you when you can go home. Have an adult family member or friend drive you.  Recovering at home  Once home, follow any instructions you are given. During your recovery:  · Take pain medicine exactly as directed.  · To prevent swelling, sit or lie with your leg raised on one or more pillows. Do this for the first 2 days.  · Follow your surgeon's instructions about putting weight on your foot after the surgery. You may need to use a walker, cane, or crutches for a time.  · You may wear a brace, surgical shoe, or cast for up to a month or longer. Care for this as instructed. Keep it dry by wrapping it in plastic bags when bathing.  · Avoid sports and other activities until your surgeon says its OK.  · Care for your incision as instructed.  · Don't drive until your surgeon says its OK.  Call your surgeon if you have any of the following:  · Chest pain or trouble breathing  · Fever of 100.4°F (38°C) or higher, or as directed by your surgeon  · Pain that isnt helped by medicine or rest  · Increased swelling not helped by raising or icing your foot  · Signs of infection at any incision site, such as increased redness or swelling, warmth, more pain, or bad-smelling drainage  · Bleeding through the bandages  · Symptoms of poor circulation, such as toes that look blue instead of pinkish  · Numbness that doesnt go away  · Any other signs or symptoms indicated by your surgeon   Follow-up  Keep all follow-up appointments with your surgeon. These are to check that you are healing well from the surgery. You may have X-rays to check how the bone is healing. Physical therapy, foot exercises, and other treatments may be discussed at follow-up visits. Full  recovery can take at least a few months.  Risks and possible complications include:  · Infection  · Bleeding  · Sensitivity at the incision site for months after the surgery  · Foot pain that doesnt go away after surgery  · Numbness in the foot  · Only partial relief of symptoms, or no relief of symptoms  · Return of the bunion  · Risks of anesthesia (the anesthesiologist will discuss these with you)  · Poor wound healing  · Breakage of screws or pins  · A lot of scarring   Date Last Reviewed: 7/28/2015  © 2532-1165 [employer]. [address] [place] [address], [place] [address]. All rights reserved. This information is not intended as a substitute for professional medical care. Always follow your healthcare professional's instructions.        Discharge Instructions: After Your Surgery  Youve just had surgery. During surgery, you were given medicine called anesthesia to keep you relaxed and free of pain. After surgery, you may have some pain or nausea. This is common. Here are some tips for feeling better and getting well after surgery.     Stay on schedule with your medicine.   Going home  Your healthcare provider will show you how to take care of yourself when you go home. He or she will also answer your questions. Have an adult family member or friend drive you home. For the first 24 hours after your surgery:  · Do not drive or use heavy equipment.  · Do not make important decisions or sign legal papers.  · Do not drink alcohol.  · Have someone stay with you, if needed. He or she can watch for problems and help keep you safe.  Be sure to go to all follow-up visits with your healthcare provider. And rest after your surgery for as long as your healthcare provider tells you to.  Coping with pain  If you have pain after surgery, pain medicine will help you feel better. Take it as told, before pain becomes severe. Also, ask your healthcare provider or pharmacist about other ways to control pain. This might be  with heat, ice, or relaxation. And follow any other instructions your surgeon or nurse gives you.  Tips for taking pain medicine  To get the best relief possible, remember these points:  · Pain medicines can upset your stomach. Taking them with a little food may help.  · Most pain relievers taken by mouth need at least 20 to 30 minutes to start to work.  · Taking medicine on a schedule can help you remember to take it. Try to time your medicine so that you can take it before starting an activity. This might be before you get dressed, go for a walk, or sit down for dinner.  · Constipation is a common side effect of pain medicines. Call your healthcare provider before taking any medicines such as laxatives or stool softeners to help ease constipation. Also ask if you should skip any foods. Drinking lots of fluids and eating foods such as fruits and vegetables that are high in fiber can also help. Remember, do not take laxatives unless your surgeon has prescribed them.  · Drinking alcohol and taking pain medicine can cause dizziness and slow your breathing. It can even be deadly. Do not drink alcohol while taking pain medicine.  · Pain medicine can make you react more slowly to things. Do not drive or run machinery while taking pain medicine.  Your healthcare provider may tell you to take acetaminophen to help ease your pain. Ask him or her how much you are supposed to take each day. Acetaminophen or other pain relievers may interact with your prescription medicines or other over-the-counter (OTC) medicines. Some prescription medicines have acetaminophen and other ingredients. Using both prescription and OTC acetaminophen for pain can cause you to overdose. Read the labels on your OTC medicines with care. This will help you to clearly know the list of ingredients, how much to take, and any warnings. It may also help you not take too much acetaminophen. If you have questions or do not understand the information, ask  your pharmacist or healthcare provider to explain it to you before you take the OTC medicine.  Managing nausea  Some people have an upset stomach after surgery. This is often because of anesthesia, pain, or pain medicine, or the stress of surgery. These tips will help you handle nausea and eat healthy foods as you get better. If you were on a special food plan before surgery, ask your healthcare provider if you should follow it while you get better. These tips may help:  · Do not push yourself to eat. Your body will tell you when to eat and how much.  · Start off with clear liquids and soup. They are easier to digest.  · Next try semi-solid foods, such as mashed potatoes, applesauce, and gelatin, as you feel ready.  · Slowly move to solid foods. Dont eat fatty, rich, or spicy foods at first.  · Do not force yourself to have 3 large meals a day. Instead eat smaller amounts more often.  · Take pain medicines with a small amount of solid food, such as crackers or toast, to avoid nausea.     Call your surgeon if  · You still have pain an hour after taking medicine. The medicine may not be strong enough.  · You feel too sleepy, dizzy, or groggy. The medicine may be too strong.  · You have side effects like nausea, vomiting, or skin changes, such as rash, itching, or hives.       If you have obstructive sleep apnea  You were given anesthesia medicine during surgery to keep you comfortable and free of pain. After surgery, you may have more apnea spells because of this medicine and other medicines you were given. The spells may last longer than usual.   At home:  · Keep using the continuous positive airway pressure (CPAP) device when you sleep. Unless your healthcare provider tells you not to, use it when you sleep, day or night. CPAP is a common device used to treat obstructive sleep apnea.  · Talk with your provider before taking any pain medicine, muscle relaxants, or sedatives. Your provider will tell you about the  possible dangers of taking these medicines.  Date Last Reviewed: 12/1/2016  © 5277-1246 [employer]. [address] [place] [address], [place] [address]. All rights reserved. This information is not intended as a substitute for professional medical care. Always follow your healthcare professional's instructions.

## 2019-04-04 NOTE — PLAN OF CARE
Patient received discharge instructions and prescriptions.  Patient verbalized understanding of all instructions given and all questions were addressed prior to patient's discharge.  Patient's vital signs are stable and within patient's baseline.  Patient tolerated clear liquids PO.  Patient voided 550 mL of clear yellow urine without difficulty in post-op.  Patient denies pain.  Patient denies nausea and vomiting at this time.  Patient meets all criteria for discharge at this time.  All required consents present in patient's chart upon patient's discharge.

## 2019-04-04 NOTE — TRANSFER OF CARE
"Anesthesia Transfer of Care Note    Patient: Willie Roach    Procedure(s) Performed: Procedure(s) (LRB):  BUNIONECTOMY (Right)    Patient location: [facility]    Anesthesia Type: general    Transport from OR: Transported from OR on room air with adequate spontaneous ventilation    Post pain: adequate analgesia    Post assessment: no apparent anesthetic complications and tolerated procedure well    Post vital signs: stable    Level of consciousness: awake    Nausea/Vomiting: no nausea/vomiting    Complications: none    Transfer of care protocol was followed      Last vitals:   Visit Vitals  /77 (BP Location: Left arm, Patient Position: Lying)   Pulse 65   Temp 36.6 °C (97.8 °F) (Oral)   Resp 18   Ht 5' 7" (1.702 m)   Wt 99.8 kg (220 lb)   SpO2 100%   BMI 34.46 kg/m²     "

## 2019-04-04 NOTE — PLAN OF CARE
Patient arrived from OR.  Patient stable.  Report received at this time from JOHNNY Field RN and JENNIFER Payne CRNA.  Assumed care of patient at this time.

## 2019-04-04 NOTE — LETTER
April 8, 2019         [address]  [facility] [address]  Phone: [phone]  Fax: [phone]       Patient: Willie Roach   YOB: 1960  Date of Visit: 04/08/2019    To Whom It May Concern:    BLU Roach  was at [facility] on 4/4/19. He may return to work in 6 weeks with no restrictions secondary to foot surgery. If you have any questions or concerns, or if I can be of further assistance, please do not hesitate to contact me.    Sincerely,    Tyler Perez DPM

## 2019-04-04 NOTE — ANESTHESIA POSTPROCEDURE EVALUATION
Anesthesia Post Evaluation    Patient: Willie Roach    Procedure(s) Performed: Procedure(s) (LRB):  BUNIONECTOMY (Right)    Final Anesthesia Type: general  Patient location during evaluation: [facility]  Patient participation: Yes- Able to Participate  Level of consciousness: awake and alert  Post-procedure vital signs: reviewed and stable  Pain management: adequate  Airway patency: patent  PONV status at discharge: No PONV  Anesthetic complications: no      Cardiovascular status: blood pressure returned to baseline  Respiratory status: unassisted  Hydration status: euvolemic  Follow-up not needed.          Vitals Value Taken Time   /70 4/4/2019 10:31 AM   Temp 36.3 °C (97.3 °F) 4/4/2019  8:05 AM   Pulse 79 4/4/2019 10:37 AM   Resp 12 4/4/2019 10:15 AM   SpO2 97 % 4/4/2019 10:37 AM   Vitals shown include unvalidated device data.      No case tracking events are documented in the log.      Pain/Patrick Score: Pain Rating Prior to Med Admin: 6 (4/4/2019 10:20 AM)  Patrick Score: 9 (4/4/2019  8:20 AM)

## 2019-04-04 NOTE — PROGRESS NOTES
Paged Dr. JEANETH Groves to inform him that patient stated that PAWAN Groves would prescribe pain medication, but medication has not been prescribed as of yet. Dr. Groves stated that he ckecked with Dr. Perez and a Rx for meperidine has been sent to patient's pharmacy in [place]. Informed patient and patient verbalized understanding.

## 2019-04-08 ENCOUNTER — TELEPHONE (OUTPATIENT)
Dept: PODIATRY | Facility: CLINIC | Age: 59
End: 2019-04-08

## 2019-04-08 NOTE — TELEPHONE ENCOUNTER
----- Message from Latia Hernandez sent at 4/8/2019  8:42 AM CDT -----  Contact: patient   Please call pt at [phone]    Patient is requesting a letter stating his re-evaluation is 30 days after surgery immediately    Send to [facility] Dept Attn Chief Cole fax# [phone]    Thank you

## 2019-04-08 NOTE — TELEPHONE ENCOUNTER
Spoke to patient and he states that he needs letter from Dr. Perez stating that he is going to be out for six weeks after his surgery and will return to work then, if possible to be fax to [phone] Att [facility] Dept. Chief Cole. Advice Dr. Perez to please write the note at once to fax it.

## 2019-04-10 ENCOUNTER — OFFICE VISIT (OUTPATIENT)
Dept: PODIATRY | Facility: CLINIC | Age: 59
End: 2019-04-10
Payer: COMMERCIAL

## 2019-04-10 VITALS
BODY MASS INDEX: 34.53 KG/M2 | DIASTOLIC BLOOD PRESSURE: 77 MMHG | HEIGHT: 67 IN | WEIGHT: 220 LBS | HEART RATE: 65 BPM | SYSTOLIC BLOOD PRESSURE: 122 MMHG

## 2019-04-10 DIAGNOSIS — M20.11 HALLUX VALGUS OF RIGHT FOOT: Primary | ICD-10-CM

## 2019-04-10 PROCEDURE — 99024 POSTOP FOLLOW-UP VISIT: CPT | Mod: S$GLB,,, | Performed by: PODIATRIST

## 2019-04-10 PROCEDURE — 99999 PR PBB SHADOW E&M-EST. PATIENT-LVL III: CPT | Mod: PBBFAC,,, | Performed by: PODIATRIST

## 2019-04-10 PROCEDURE — 99999 PR PBB SHADOW E&M-EST. PATIENT-LVL III: ICD-10-PCS | Mod: PBBFAC,,, | Performed by: PODIATRIST

## 2019-04-10 PROCEDURE — 99024 PR POST-OP FOLLOW-UP VISIT: ICD-10-PCS | Mod: S$GLB,,, | Performed by: PODIATRIST

## 2019-04-11 NOTE — PROGRESS NOTES
Willie presents 6 days status post bunionectomy with nerve decompression right foot.  He is doing very well with minimal discomfort.  He is no longer taking pain medication at this point however sometimes at night.  He has been keeping the area clean and dry.  He presents with the bandages intact.  Incision is healing well with no signs of infection or dehiscence.  Range of motion exercises discussed with the patient. Good range of motion noted for 6 days postop.  Joint is in very good alignment.  Follow up in 1 week.  Continue to keep the area clean and dry with the bandages intact and walking boot and/or knee scooter.

## 2019-04-18 ENCOUNTER — OFFICE VISIT (OUTPATIENT)
Dept: PODIATRY | Facility: CLINIC | Age: 59
End: 2019-04-18
Payer: COMMERCIAL

## 2019-04-18 VITALS
TEMPERATURE: 98 F | DIASTOLIC BLOOD PRESSURE: 81 MMHG | WEIGHT: 220 LBS | RESPIRATION RATE: 19 BRPM | BODY MASS INDEX: 34.53 KG/M2 | HEART RATE: 94 BPM | SYSTOLIC BLOOD PRESSURE: 153 MMHG | HEIGHT: 67 IN

## 2019-04-18 DIAGNOSIS — M20.11 HALLUX VALGUS OF RIGHT FOOT: Primary | ICD-10-CM

## 2019-04-18 PROCEDURE — 99999 PR PBB SHADOW E&M-EST. PATIENT-LVL III: CPT | Mod: PBBFAC,,, | Performed by: PODIATRIST

## 2019-04-18 PROCEDURE — 99024 POSTOP FOLLOW-UP VISIT: CPT | Mod: S$GLB,,, | Performed by: PODIATRIST

## 2019-04-18 PROCEDURE — 99999 PR PBB SHADOW E&M-EST. PATIENT-LVL III: ICD-10-PCS | Mod: PBBFAC,,, | Performed by: PODIATRIST

## 2019-04-18 PROCEDURE — 99024 PR POST-OP FOLLOW-UP VISIT: ICD-10-PCS | Mod: S$GLB,,, | Performed by: PODIATRIST

## 2019-04-25 ENCOUNTER — OFFICE VISIT (OUTPATIENT)
Dept: PODIATRY | Facility: CLINIC | Age: 59
End: 2019-04-25
Payer: COMMERCIAL

## 2019-04-25 VITALS
HEART RATE: 78 BPM | WEIGHT: 220 LBS | BODY MASS INDEX: 34.53 KG/M2 | SYSTOLIC BLOOD PRESSURE: 127 MMHG | HEIGHT: 67 IN | DIASTOLIC BLOOD PRESSURE: 84 MMHG

## 2019-04-25 DIAGNOSIS — M20.10 VALGUS DEFORMITY OF GREAT TOE, UNSPECIFIED LATERALITY: Primary | ICD-10-CM

## 2019-04-25 PROCEDURE — 99024 PR POST-OP FOLLOW-UP VISIT: ICD-10-PCS | Mod: S$GLB,,, | Performed by: PODIATRIST

## 2019-04-25 PROCEDURE — 99999 PR PBB SHADOW E&M-EST. PATIENT-LVL III: CPT | Mod: PBBFAC,,, | Performed by: PODIATRIST

## 2019-04-25 PROCEDURE — 99999 PR PBB SHADOW E&M-EST. PATIENT-LVL III: ICD-10-PCS | Mod: PBBFAC,,, | Performed by: PODIATRIST

## 2019-04-25 PROCEDURE — 99024 POSTOP FOLLOW-UP VISIT: CPT | Mod: S$GLB,,, | Performed by: PODIATRIST

## 2019-04-28 NOTE — PROGRESS NOTES
Willie presents 3 weeks status post bunionectomy with nerve decompression right foot.  He is doing very well with minimal discomfort.  He is no longer taking pain medication at this point however sometimes at night.  He has been keeping the area clean and dry.  He presents with the bandages intact.  Incision is healing well with no signs of infection or dehiscence.  Range of motion exercises discussed with the patient. Good range of motion noted for 6 days postop.  Joint is in very good alignment.  Follow up in 1 week.  Continue to keep the area clean and dry with the bandages intact and walking boot and/or knee scooter.Sutures removed under asceptic conditions, dry sterile bandage applied. Patient is cleared to begin light bathing of operative site.  No oils, lotions, or ointments to incision for two weeks

## 2019-04-29 ENCOUNTER — TELEPHONE (OUTPATIENT)
Dept: PODIATRY | Facility: CLINIC | Age: 59
End: 2019-04-29

## 2019-04-29 DIAGNOSIS — G89.18 POST-OPERATIVE PAIN: Primary | ICD-10-CM

## 2019-04-29 RX ORDER — MEPERIDINE HYDROCHLORIDE 50 MG/1
50 TABLET ORAL EVERY 4 HOURS PRN
Qty: 30 TABLET | Refills: 0 | Status: SHIPPED | OUTPATIENT
Start: 2019-04-29 | End: 2019-04-29

## 2019-04-29 NOTE — TELEPHONE ENCOUNTER
----- Message from Kayla Hardwick sent at 4/29/2019  2:50 PM CDT -----  Contact: [email or web address]  Type:  RX Refill Request    Who Called:  Patient  Refill or New Rx:  Refill  RX Name and Strength:  Demerol  How is the patient currently taking it? (ex. 1XDay):    Is this a 30 day or 90 day RX:  30  Preferred Pharmacy with phone number:  [facility]  Local or Mail Order:  Local  Ordering Provider:    Best Call Back Number:  [phone]  Additional Information:

## 2019-04-30 ENCOUNTER — TELEPHONE (OUTPATIENT)
Dept: PODIATRY | Facility: CLINIC | Age: 59
End: 2019-04-30

## 2019-05-09 ENCOUNTER — OFFICE VISIT (OUTPATIENT)
Dept: PODIATRY | Facility: CLINIC | Age: 59
End: 2019-05-09
Payer: COMMERCIAL

## 2019-05-09 VITALS
DIASTOLIC BLOOD PRESSURE: 73 MMHG | HEART RATE: 92 BPM | WEIGHT: 220 LBS | HEIGHT: 67 IN | BODY MASS INDEX: 34.53 KG/M2 | SYSTOLIC BLOOD PRESSURE: 121 MMHG

## 2019-05-09 DIAGNOSIS — M20.11 HALLUX VALGUS OF RIGHT FOOT: Primary | ICD-10-CM

## 2019-05-09 PROCEDURE — 99024 PR POST-OP FOLLOW-UP VISIT: ICD-10-PCS | Mod: S$GLB,,, | Performed by: PODIATRIST

## 2019-05-09 PROCEDURE — 99024 POSTOP FOLLOW-UP VISIT: CPT | Mod: S$GLB,,, | Performed by: PODIATRIST

## 2019-05-09 PROCEDURE — 99999 PR PBB SHADOW E&M-EST. PATIENT-LVL III: CPT | Mod: PBBFAC,,, | Performed by: PODIATRIST

## 2019-05-09 PROCEDURE — 99999 PR PBB SHADOW E&M-EST. PATIENT-LVL III: ICD-10-PCS | Mod: PBBFAC,,, | Performed by: PODIATRIST

## 2019-05-10 NOTE — H&P (VIEW-ONLY)
Subjective:      Patient ID: Willie Roach is a 59 y.o. male. Pre-operative H&P.    Chief Complaint: Valgus deformity of great toe, unspecified laterality ( rt foot)    Willie is a 59 y.o. male who presents to the podiatry clinic  with complaint of  right foot pain. Onset of the symptoms was several years ago. Precipitating event: none known. Current symptoms include: ability to bear weight, but with some pain. Aggravating factors: walking. Symptoms have gradually worsened. Patient has had no prior foot problems. Evaluation to date: none. Treatment to date: avoidance of offending activity. Patients rates pain 8/10 on pain scale.        Review of Systems   Constitution: Negative for chills and fever.   HENT: Negative for congestion and tinnitus.    Eyes: Negative for double vision and visual disturbance.   Cardiovascular: Negative for chest pain and claudication.   Respiratory: Negative for hemoptysis and shortness of breath.    Endocrine: Negative for cold intolerance and heat intolerance.   Hematologic/Lymphatic: Negative for adenopathy and bleeding problem.   Skin: Negative for color change and nail changes.   Musculoskeletal: Negative for myalgias and stiffness.   Gastrointestinal: Negative for nausea and vomiting.   Genitourinary: Negative for dysuria and hematuria.   Neurological: Negative for numbness and paresthesias.   Psychiatric/Behavioral: Negative for altered mental status and suicidal ideas.   Allergic/Immunologic: Negative for environmental allergies and persistent infections.           Objective:      Physical Exam   Constitutional: He is oriented to person, place, and time. Vital signs are normal. He appears well-developed and well-nourished.   Cardiovascular:   Pulses:       Dorsalis pedis pulses are 2+ on the right side, and 2+ on the left side.        Posterior tibial pulses are 2+ on the right side, and 2+ on the left side.   Musculoskeletal:        Right foot: There is normal range of  motion and no deformity.        Left foot: There is normal range of motion and no deformity.   Inspection and palpation of the muscles joints and bones of both lower extremities reveal that muscle strength for the anterior, lateral, and posterior muscle groups and intrinsic muscle groups of the foot are all 5 over 5 symmetrical. Ankle, subtalar, midtarsal, and digital joint range of motion  are within normal limits, nonpainful, without crepitus or effusion. Patient exhibits a normal angle and base of gait. Palpation of the tendons reveal no defects.  Bunion deformity noted right foot with mild to moderate hypermobility at the 1st metatarsocuneiform joint as well as distal deformity of the 1st metatarsophalangeal joint. Tenderness with palpation medially as well as with range of motion.     Feet:   Right Foot:   Skin Integrity: Negative for skin breakdown or erythema.   Left Foot:   Skin Integrity: Negative for skin breakdown or erythema.   Neurological: He is oriented to person, place, and time. He has normal strength. No sensory deficit.   Reflex Scores:       Patellar reflexes are 2+ on the right side and 2+ on the left side.       Achilles reflexes are 2+ on the right side and 2+ on the left side.  Sharp, dull, light touch, vibratory, and proprioceptive sensation are intact bilaterally. Deep tendon reflexes to patellar and Achilles tendon are symmetrical, 2/4 bilaterally. No ankle clonus or Babinski reflexes noted bilaterally. Coordination is normal to both feet and lower extremities.   Skin: Skin is warm, dry and intact. No cyanosis. Nails show no clubbing.   Skin turgor is normal bilaterally. Skin texture is well hydrated to both lower extremities. No lesions or rashes or wounds appreciated bilaterally. Nail plates 1 through 5 bilaterally are within normal limits for length and thickness. No nail clubbing or incurvation noted.             Assessment:       Encounter Diagnosis   Name Primary?    Hallux valgus  of right foot Yes         Plan:       Willie was seen today for valgus deformity of great toe, unspecified laterality.    Diagnoses and all orders for this visit:    Hallux valgus of right foot  -     X-Ray Foot Complete Right; Future      I counseled the patient on his conditions, their implications and medical management.      The patient has decided to forego any further conservative treatment and opted for surgical intervention.  Alternative treatments, and benefits of surgery were discussed with the patient.  Risks and complications, including but not limited to: pain, swelling, numbness, infection, failure to achieve the stated goals, recurrence of problem, nerve and blood vessel damage, scar tissue formation, further need of surgery, loss of limb or life, was discussed in detail with the patient.  All questions asked were answered, and written informed consent was sign and received. The patient will undergo bunionectomy right foot consisting of possible Lapidus procedure/combination of proximal and distal osteotomies with Hiro.      .

## 2019-05-10 NOTE — PROGRESS NOTES
Willie presents 4 weeks status post bunionectomy with nerve decompression right foot.  He is doing very well with minimal discomfort.  He is no longer taking pain medication at this point however sometimes at night.  He has been keeping the area clean and dry.  He presents with the bandages intact.  Incision is healing well with no signs of infection or dehiscence.  Range of motion exercises discussed with the patient. Good range of motion noted for 6 days postop.  Joint is in very good alignment.  Follow up in 2 weeks.  Continue to keep the area clean and dry with the bandages intact and walking boot and/or knee scooter.Subjective:      Patient ID: Willie Roach is a 59 y.o. male.    Chief Complaint: Valgus deformity of great toe, unspecified laterality ( rt foot)    {POD HPI BLOCKS:33916}    ROS        Objective:      Physical Exam          Assessment:       Encounter Diagnosis   Name Primary?    Hallux valgus of right foot Yes         Plan:       Willie was seen today for valgus deformity of great toe, unspecified laterality.    Diagnoses and all orders for this visit:    Hallux valgus of right foot  -     X-Ray Foot Complete Right; Future      I counseled the patient on his conditions, their implications and medical management.        {PROCEDURES:17996}

## 2019-05-13 ENCOUNTER — CLINICAL SUPPORT (OUTPATIENT)
Dept: OTHER | Facility: CLINIC | Age: 59
End: 2019-05-13
Payer: COMMERCIAL

## 2019-05-13 DIAGNOSIS — Z00.8 ENCOUNTER FOR OTHER GENERAL EXAMINATION: ICD-10-CM

## 2019-05-13 PROCEDURE — 80061 LIPID PANEL: CPT | Mod: QW,S$GLB,, | Performed by: INTERNAL MEDICINE

## 2019-05-13 PROCEDURE — 99401 PREV MED CNSL INDIV APPRX 15: CPT | Mod: S$GLB,,, | Performed by: INTERNAL MEDICINE

## 2019-05-13 PROCEDURE — 82947 ASSAY GLUCOSE BLOOD QUANT: CPT | Mod: QW,S$GLB,, | Performed by: INTERNAL MEDICINE

## 2019-05-13 PROCEDURE — 82947 PR  ASSAY QUANTITATIVE,BLOOD GLUCOSE: ICD-10-PCS | Mod: QW,S$GLB,, | Performed by: INTERNAL MEDICINE

## 2019-05-13 PROCEDURE — 80061 PR  LIPID PANEL: ICD-10-PCS | Mod: QW,S$GLB,, | Performed by: INTERNAL MEDICINE

## 2019-05-13 PROCEDURE — 99401 PR PREVENT COUNSEL,INDIV,15 MIN: ICD-10-PCS | Mod: S$GLB,,, | Performed by: INTERNAL MEDICINE

## 2019-05-14 VITALS — HEIGHT: 67 IN | BODY MASS INDEX: 34.46 KG/M2

## 2019-05-14 LAB
GLUCOSE SERPL-MCNC: 94 MG/DL (ref 60–140)
HDLC SERPL-MCNC: 49 MG/DL
POC CHOLESTEROL, LDL (DOCK): 118 MG/DL
POC CHOLESTEROL, TOTAL: 191 MG/DL
TRIGL SERPL-MCNC: 119 MG/DL

## 2019-05-22 ENCOUNTER — HOSPITAL ENCOUNTER (OUTPATIENT)
Dept: RADIOLOGY | Facility: HOSPITAL | Age: 59
Discharge: HOME | End: 2019-05-22
Attending: PODIATRIST
Payer: COMMERCIAL

## 2019-05-22 DIAGNOSIS — M20.11 HALLUX VALGUS OF RIGHT FOOT: ICD-10-CM

## 2019-05-22 PROCEDURE — 73630 XR FOOT COMPLETE 3 VIEW RIGHT: ICD-10-PCS | Mod: 26,RT,, | Performed by: RADIOLOGY

## 2019-05-22 PROCEDURE — 73630 X-RAY EXAM OF FOOT: CPT | Mod: TC,RT

## 2019-05-22 PROCEDURE — 73630 X-RAY EXAM OF FOOT: CPT | Mod: 26,RT,, | Performed by: RADIOLOGY

## 2019-05-23 ENCOUNTER — OFFICE VISIT (OUTPATIENT)
Dept: PODIATRY | Facility: CLINIC | Age: 59
End: 2019-05-23
Payer: COMMERCIAL

## 2019-05-23 VITALS
WEIGHT: 220 LBS | HEIGHT: 67 IN | BODY MASS INDEX: 34.53 KG/M2 | SYSTOLIC BLOOD PRESSURE: 118 MMHG | DIASTOLIC BLOOD PRESSURE: 80 MMHG | HEART RATE: 82 BPM

## 2019-05-23 DIAGNOSIS — M20.11 HALLUX VALGUS OF RIGHT FOOT: Primary | ICD-10-CM

## 2019-05-23 PROCEDURE — 99024 PR POST-OP FOLLOW-UP VISIT: ICD-10-PCS | Mod: S$GLB,,, | Performed by: PODIATRIST

## 2019-05-23 PROCEDURE — 99999 PR PBB SHADOW E&M-EST. PATIENT-LVL III: ICD-10-PCS | Mod: PBBFAC,,, | Performed by: PODIATRIST

## 2019-05-23 PROCEDURE — 99024 POSTOP FOLLOW-UP VISIT: CPT | Mod: S$GLB,,, | Performed by: PODIATRIST

## 2019-05-23 PROCEDURE — 99999 PR PBB SHADOW E&M-EST. PATIENT-LVL III: CPT | Mod: PBBFAC,,, | Performed by: PODIATRIST

## 2019-05-23 NOTE — PROGRESS NOTES
Willie presents 6 weeks status post bunionectomy with nerve decompression right foot.  He is doing very well with minimal discomfort.  He is no longer taking pain medication at this point however sometimes at night.  Begin transitioning out of walking boot as described in detail.  Continue range-of-motion exercises.  Follow-up in 4 weeks.

## 2019-05-24 ENCOUNTER — PATIENT MESSAGE (OUTPATIENT)
Dept: PODIATRY | Facility: CLINIC | Age: 59
End: 2019-05-24

## 2019-06-04 NOTE — INTERVAL H&P NOTE
The patient has been examined and the H&P has been reviewed:    I concur with the findings and no changes have occurred since H&P was written.    Anesthesia/Surgery risks, benefits and alternative options discussed and understood by patient/family.          Active Hospital Problems    Diagnosis  POA    Nerve entrapment of lower limb, right [G57.81]  Unknown    Bunion [M21.619]  Yes    Other infective (teno)synovitis, right ankle and foot [M65.171]  Unknown      Resolved Hospital Problems   No resolved problems to display.

## 2019-06-11 ENCOUNTER — OFFICE VISIT (OUTPATIENT)
Dept: PODIATRY | Facility: CLINIC | Age: 59
End: 2019-06-11
Payer: COMMERCIAL

## 2019-06-11 VITALS
SYSTOLIC BLOOD PRESSURE: 122 MMHG | WEIGHT: 229 LBS | HEIGHT: 67 IN | DIASTOLIC BLOOD PRESSURE: 75 MMHG | HEART RATE: 69 BPM | BODY MASS INDEX: 35.94 KG/M2

## 2019-06-11 DIAGNOSIS — M20.11 HALLUX VALGUS OF RIGHT FOOT: Primary | ICD-10-CM

## 2019-06-11 DIAGNOSIS — G89.18 POST-OPERATIVE PAIN: ICD-10-CM

## 2019-06-11 PROCEDURE — 99024 PR POST-OP FOLLOW-UP VISIT: ICD-10-PCS | Mod: S$GLB,,, | Performed by: PODIATRIST

## 2019-06-11 PROCEDURE — 99999 PR PBB SHADOW E&M-EST. PATIENT-LVL II: ICD-10-PCS | Mod: PBBFAC,,, | Performed by: PODIATRIST

## 2019-06-11 PROCEDURE — 99999 PR PBB SHADOW E&M-EST. PATIENT-LVL II: CPT | Mod: PBBFAC,,, | Performed by: PODIATRIST

## 2019-06-11 PROCEDURE — 99024 POSTOP FOLLOW-UP VISIT: CPT | Mod: S$GLB,,, | Performed by: PODIATRIST

## 2019-06-13 ENCOUNTER — OCCUPATIONAL HEALTH (OUTPATIENT)
Dept: URGENT CARE | Facility: CLINIC | Age: 59
End: 2019-06-13

## 2019-06-13 DIAGNOSIS — Z02.1 PRE-EMPLOYMENT EXAMINATION: Primary | ICD-10-CM

## 2019-06-13 PROCEDURE — 99499 UNLISTED E&M SERVICE: CPT | Mod: S$GLB,,, | Performed by: PREVENTIVE MEDICINE

## 2019-06-13 PROCEDURE — 99499 RETURN TO WORK EXAM: BASIC: ICD-10-PCS | Mod: S$GLB,,, | Performed by: PREVENTIVE MEDICINE

## 2019-06-14 NOTE — PROGRESS NOTES
Willie presents 8 weeks status post bunionectomy with nerve decompression right foot.  He is doing very well with minimal discomfort.  He is no longer taking pain medication at this point however sometimes at night.  Begin transitioning out of walking boot as described in detail.  Continue range-of-motion exercises.  Resume normal shoe gear and activities.  Follow-up in 3 months.

## 2019-09-04 ENCOUNTER — ANESTHESIA EVENT (OUTPATIENT)
Dept: SURGERY | Facility: HOSPITAL | Age: 59
End: 2019-09-04
Payer: COMMERCIAL

## 2019-09-04 ENCOUNTER — OFFICE VISIT (OUTPATIENT)
Dept: OTOLARYNGOLOGY | Facility: CLINIC | Age: 59
End: 2019-09-04
Payer: COMMERCIAL

## 2019-09-04 VITALS
WEIGHT: 218.94 LBS | BODY MASS INDEX: 34.29 KG/M2 | HEART RATE: 77 BPM | SYSTOLIC BLOOD PRESSURE: 114 MMHG | TEMPERATURE: 98 F | DIASTOLIC BLOOD PRESSURE: 77 MMHG

## 2019-09-04 DIAGNOSIS — J34.2 NASAL SEPTAL DEVIATION: ICD-10-CM

## 2019-09-04 DIAGNOSIS — R09.82 POSTNASAL DRIP: ICD-10-CM

## 2019-09-04 DIAGNOSIS — J34.3 NASAL TURBINATE HYPERTROPHY: ICD-10-CM

## 2019-09-04 DIAGNOSIS — J01.01 ACUTE RECURRENT MAXILLARY SINUSITIS: Primary | ICD-10-CM

## 2019-09-04 DIAGNOSIS — J34.89 NASAL OBSTRUCTION: ICD-10-CM

## 2019-09-04 PROCEDURE — 99214 OFFICE O/P EST MOD 30 MIN: CPT | Mod: 25,S$GLB,, | Performed by: OTOLARYNGOLOGY

## 2019-09-04 PROCEDURE — 3008F PR BODY MASS INDEX (BMI) DOCUMENTED: ICD-10-PCS | Mod: CPTII,S$GLB,, | Performed by: OTOLARYNGOLOGY

## 2019-09-04 PROCEDURE — 99999 PR PBB SHADOW E&M-EST. PATIENT-LVL IV: ICD-10-PCS | Mod: PBBFAC,,, | Performed by: OTOLARYNGOLOGY

## 2019-09-04 PROCEDURE — 31231 PR NASAL ENDOSCOPY, DX: ICD-10-PCS | Mod: S$GLB,,, | Performed by: OTOLARYNGOLOGY

## 2019-09-04 PROCEDURE — 3008F BODY MASS INDEX DOCD: CPT | Mod: CPTII,S$GLB,, | Performed by: OTOLARYNGOLOGY

## 2019-09-04 PROCEDURE — 99214 PR OFFICE/OUTPT VISIT, EST, LEVL IV, 30-39 MIN: ICD-10-PCS | Mod: 25,S$GLB,, | Performed by: OTOLARYNGOLOGY

## 2019-09-04 PROCEDURE — 31231 NASAL ENDOSCOPY DX: CPT | Mod: S$GLB,,, | Performed by: OTOLARYNGOLOGY

## 2019-09-04 PROCEDURE — 99999 PR PBB SHADOW E&M-EST. PATIENT-LVL IV: CPT | Mod: PBBFAC,,, | Performed by: OTOLARYNGOLOGY

## 2019-09-04 NOTE — PRE ADMISSION SCREENING
Anesthesia Assessment: Preoperative EQUATION    Planned Procedure: Procedure(s) (LRB):  FESS (FUNCTIONAL ENDOSCOPIC SINUS SURGERY) (Bilateral)  SEPTOPLASTY, NOSE (N/A)  REDUCTION, NASAL TURBINATE (Bilateral)  Requested Anesthesia Type:General  Surgeon: Jeramy Cuadra MD  Service: ENT  Known or anticipated Date of Surgery:9/23/2019    Surgeon notes: reviewed    Electronic QUestionnaire Assessment completed via nurse interview with patient.        NO AQ        Triage considerations:     The patient has no apparent active cardiac condition (No unstable coronary Syndrome such as severe unstable angina or recent [<1 month] myocardial infarction, decompensated CHF, severe valvular   disease or significant arrhythmia)    Previous anesthesia records:GETA and No problems   Airway/Jaw/Neck:  Airway Findings: Mouth Opening: Normal Tongue: Normal  General Airway Assessment: Adult  Mallampati: II  Improves to II with phonation.  TM Distance: Normal, at least 6 cm      Dental:  Dental Findings:      Last PCP note: outside [facility]   Subspecialty notes: ENT, PODIATRY    Other important co-morbidities: HLD, HTN, JUVENCIO     Tests already available:  No recent tests.            Instructions given. (See in Nurse's note)    Optimization:  Anesthesia Preop Clinic Assessment  Indicated-NOT INDICATED      Plan:    Testing:  BMP, PT/INR, PTT and EKG     Patient  has previously scheduled Medical Appointment:NOT AT THIS TIME    Navigation: Tests Scheduled.              Results will be tracked by Preop Clinic.

## 2019-09-04 NOTE — H&P (VIEW-ONLY)
Head and Neck Surgery Consult    Seen in consultation from Dr. Nash    HPI: Willie Roach is a 59 y.o. male presenting with many many years of bilateral (R>L) nasal congestion and recurrent maxillary sinus infections (>3 episodes per year requiring antibiotics). He also complains of near-constant postnasal drip and was recently diagnosed with JUVENCIO. He has been on multiple topical nasal steroid sprays as well as Singulair and Zyrtec without sufficient resolution. He has not been allergy tested. He was seen and evaluated by outside ENT who had recommended surgery, but this physician was out of network.    Past Medical History:   Diagnosis Date    Anxiety     Hyperlipidemia     Hypertension        Past Surgical History:   Procedure Laterality Date    BACK SURGERY      BUNIONECTOMY Right 4/4/2019    Performed by Tyler Perez DPM at [facility] OR [address] [place] [address]    CARPAL TUNNEL RELEASE      ELBOW SURGERY      EYE SURGERY      KNEE ARTHROSCOPY           Current Outpatient Medications:     amlodipine-benazepril 5-10 mg (LOTREL) 5-10 mg per capsule, Take 1 capsule by mouth once daily., Disp: , Rfl: 0    B-complex with vitamin C (SUPER B COMPLEX-VITAMIN C ORAL), Take 1 tablet by mouth once daily., Disp: , Rfl:     brimonidine 0.2% (ALPHAGAN) 0.2 % Drop, INSTILL ONE DROP INTO EACH EYE TWICE A DAY, Disp: , Rfl: 0    cetirizine (ZYRTEC) 10 MG tablet, Take 10 mg by mouth once daily., Disp: , Rfl: 0    co-enzyme Q-10 30 mg capsule, Take 30 mg by mouth 2 (two) times daily., Disp: , Rfl:     diazePAM (VALIUM) 10 MG Tab, Take 10 mg by mouth nightly., Disp: , Rfl: 1    etodolac (LODINE) 400 MG tablet, Take 400 mg by mouth 2 (two) times daily as needed., Disp: , Rfl: 0    flaxseed oil 1,000 mg Cap, Take 1 capsule by mouth once daily., Disp: , Rfl:     simvastatin (ZOCOR) 20 MG tablet, Take 20 mg by mouth every evening., Disp: , Rfl: 0    vit A/vit C/vit E/zinc/copper (PRESERVISION AREDS ORAL), Take by mouth once daily.,  Disp: , Rfl:     Review of patient's allergies indicates:   Allergen Reactions    Codeine Hives and Itching    Angi Rash       Family History   Family history unknown: Yes       Social History     Socioeconomic History    Marital status:      Spouse name: Not on file    Number of children: Not on file    Years of education: Not on file    Highest education level: Not on file   Occupational History    Not on file   Social Needs    Financial resource strain: Not on file    Food insecurity:     Worry: Not on file     Inability: Not on file    Transportation needs:     Medical: Not on file     Non-medical: Not on file   Tobacco Use    Smoking status: Never Smoker    Smokeless tobacco: Never Used   Substance and Sexual Activity    Alcohol use: Not on file    Drug use: Not on file    Sexual activity: Not on file   Lifestyle    Physical activity:     Days per week: Not on file     Minutes per session: Not on file    Stress: Not on file   Relationships    Social connections:     Talks on phone: Not on file     Gets together: Not on file     Attends [employer] service: Not on file     Active member of club or organization: Not on file     Attends meetings of clubs or organizations: Not on file     Relationship status: Not on file   Other Topics Concern    Not on file   Social History Narrative    Not on file       Review of Systems -  Constitutional: Denies having night sweats, constant fatigue, loss of appetite or recent substantial weight loss.  Eyes: Denies blurred vision or double vision.  Respiratory: Denies symptoms of shortness of breath, noisy breathing, hoarseness or chronic cough.  GI: Denies symptoms of heartburn, acid regurgitation, or the known presence of a hiatal hernia.  The remainder of a 10-point review of systems is negative    REVIEW OF RADIOLOGICAL FILMS AND RECORDS (PERSONALLY REVIEWED):  CT reviewed - nasal septal deviation, mucous retention cyst, narrowed hiatus  semilunaris    REVIEW OF LABS (PERSONALLY REVIEWED)  Noncontributory    PHYSICAL EXAM:  Vitals - /77   Pulse 77   Temp 97.9 °F (36.6 °C)   Wt 99.3 kg (218 lb 14.7 oz)   BMI 34.29 kg/m²   Constitutional -      General Appearance: well developed, well nourished, without obvious deformities     Communication: speaks with a normal voice without hoarseness  Head & Face -     Overall: no obvious scars, lesions or masses     Parotid and submandibular glands: no masses or tenderness     Facial strength: normal and equal bilaterally  Eyes -      EOM intact  Ear, Nose, Mouth & Throat -     Ears: both left and right external auditory canals and TM's are normal, no external deformities     Nasal exam: mucosa is pink, septum is R-deviated and obstructs any view of R OMC or middle turbinate with 70% obstruction, large inferior spur on L with 40% obstruction. Visible turbinates are 3+ on anterior rhinoscopy     Mastication: teeth appear present     Oral Cavity and oropharynx: mucosa, hard and soft palates, tongue, posterior pharyngeal wall, lips and gums are without lesions. Tonsils appear 2+ and cryptic, tongue is large with Mallampatti III view.  Respiratory:     Breathing unlabored, no stridor  Cardiovascular:     No JVD, capillary refill normal  Larynx: using the mirror for indirect laryngoscopy, the epiglottic, false cords, true cords, and pyriform sinuses are without lesions and the true vocal cords move normally  Neck: appears symmetric, and on palpation is without masses   Endocrine:     Thyroid: no asymmetry, thyromegaly, or thyroid nodules on palpation  Lymphatic:     No cervical lymphadenopathy  Cranial Nerves:      II: Pupillary reflexes normal     III, IV, VI: EOM normal     V: 1,2,3: normal sensation     VII: Normal strength in all divisions     IX, X: Normal voice, palatal elevation and sensation     XI: Shoulder strength normal       XII: Tongue mobility normal  Psychiatric:     Appropriate  affect    NASAL ENDOSCOPY     Indications:  Septal deviation and recurrent acute maxillary sinusitis     Procedure:  With the patient sitting upright, informed consent was obtained.  Topical anesthesia and vasoconstriction was applied to the nares bilaterally.  After waiting an appropriate period of time for anesthesia/vasoconstriction to become effective, an endoscope was passed through both nares, and the nose and nasopharynx were examined.  The patient tolerated the procedure without complications.     Findings: The nasal passageways and nasopharynx appear normal without edema or erythema.  The septum appears R-deviated with L septal spur as above. There is a high mid deviation on the R also. The inferior turbinates appear 3+ There are no intranasal masses or visible mucopus. The Eustachian tubes are patent. The airway is widely patent.      ASSESSMENT: recurrent acute maxillary sinusitis, nasal septal deviation, turbinate hypertrophy, nasal obstruction and JUVENCIO.    PLAN: We discussed continued medical management versus surgery. As he has failed multiple topical and oral treatments he would like to schedule surgery.We discussed a likely 1-week recovery time and the use of postoperative nasal splints. I advised continuing his CPAP and if this is unsuccessful he can see one of my partners who performs JUVENCIO surgery (Catrachito Molina or [place]).       Jeramy Cuadra

## 2019-09-04 NOTE — ANESTHESIA PREPROCEDURE EVALUATION
Matilda Ramires, RN   Registered Nurse      Pre Admission Screening   Signed                       []Hide copied text    []Ambrociover for details  Anesthesia Assessment: Preoperative EQUATION     Planned Procedure: Procedure(s) (LRB):  FESS (FUNCTIONAL ENDOSCOPIC SINUS SURGERY) (Bilateral)  SEPTOPLASTY, NOSE (N/A)  REDUCTION, NASAL TURBINATE (Bilateral)  Requested Anesthesia Type:General  Surgeon: Jeramy Cuadra MD  Service: ENT  Known or anticipated Date of Surgery:9/23/2019     Surgeon notes: reviewed     Electronic QUestionnaire Assessment completed via nurse interview with patient.         NO AQ           Triage considerations:      The patient has no apparent active cardiac condition (No unstable coronary Syndrome such as severe unstable angina or recent [<1 month] myocardial infarction, decompensated CHF, severe valvular   disease or significant arrhythmia)     Previous anesthesia records:GETA and No problems   Airway/Jaw/Neck:  Airway Findings: Mouth Opening: Normal Tongue: Normal  General Airway Assessment: Adult  Mallampati: II  Improves to II with phonation.  TM Distance: Normal, at least 6 cm      Dental:  Dental Findings:       Last PCP note: outside [facility]   Subspecialty notes: ENT, PODIATRY     Other important co-morbidities: HLD, HTN, JUEVNCIO     Tests already available:  No recent tests.                            Instructions given. (See in Nurse's note)     Optimization:  Anesthesia Preop Clinic Assessment  Indicated-NOT INDICATED                Plan:    Testing:  BMP, PT/INR, PTT and EKG                           Patient  has previously scheduled Medical Appointment:NOT AT THIS TIME     Navigation: Tests Scheduled.                         Results will be tracked by Preop Clinic.        9/10/19-Labs and Ekg reviewed. Juanpablo Lazo LPN                09/04/2019  Willie Roach is a 59 y.o.,  male.    Anesthesia Evaluation    I have reviewed the Patient Summary Reports.     I have reviewed the Medications.     Review of Systems  Anesthesia Hx:  No problems with previous Anesthesia  Denies Family Hx of Anesthesia complications.   Denies Personal Hx of Anesthesia complications.   Social:  Non-Smoker, Social Alcohol Use    Hematology/Oncology:  Hematology Normal   Oncology Normal     EENT/Dental:   ACUTE RECURRENT MAXILLARY SINUSITIS. NASAL SEPTAL DEVIATION. NASAL TURBINATE HYPERTROPHY. NASAL OBSTRUCTION. POSTNASAL DRIP.   Cardiovascular:   Exercise tolerance: good Hypertension  Denies Angina. hyperlipidemia  Functional Capacity 4 METS    Pulmonary:   Denies Shortness of breath.  Denies Recent URI. Sleep Apnea    Renal/:  Renal/ Normal     Hepatic/GI:  Hepatic/GI Normal    Musculoskeletal:  Musculoskeletal Normal    Neurological:  Neurology Normal  Denies CVA. Denies Seizures.    Endocrine:  Endocrine Normal    Psych:  Anxiety Disorder.          Physical Exam  General:  Obesity    Airway/Jaw/Neck:  Airway Findings: Mouth Opening: Normal Tongue: Normal  General Airway Assessment: Adult  Mallampati: II  TM Distance: Normal, at least 6 cm  Jaw/Neck Findings:  Neck ROM: Normal ROM  Neck Findings:  Girth Increased      Dental:  Dental Findings:         Mental Status:  Mental Status Findings:  Cooperative, Alert and Oriented         Anesthesia Plan  Type of Anesthesia, risks & benefits discussed:  Anesthesia Type:  general  Patient's Preference:   Intra-op Monitoring Plan: standard ASA monitors  Intra-op Monitoring Plan Comments:   Post Op Pain Control Plan: per primary service following discharge from PACU and IV/PO Opioids PRN  Post Op Pain Control Plan Comments:   Induction:   IV  Beta Blocker:  Patient is not currently on a Beta-Blocker (No further documentation required).       Informed Consent: Patient understands risks and agrees with Anesthesia plan.  Questions answered. Anesthesia consent signed with  patient.  ASA Score: 3     Day of Surgery Review of History & Physical:    H&P update referred to the surgeon.         Ready For Surgery From Anesthesia Perspective.

## 2019-09-04 NOTE — PROGRESS NOTES
Head and Neck Surgery Consult    Seen in consultation from Dr. Nash    HPI: Willie Roach is a 59 y.o. male presenting with many many years of bilateral (R>L) nasal congestion and recurrent maxillary sinus infections (>3 episodes per year requiring antibiotics). He also complains of near-constant postnasal drip and was recently diagnosed with JUVENCIO. He has been on multiple topical nasal steroid sprays as well as Singulair and Zyrtec without sufficient resolution. He has not been allergy tested. He was seen and evaluated by outside ENT who had recommended surgery, but this physician was out of network.    Past Medical History:   Diagnosis Date    Anxiety     Hyperlipidemia     Hypertension        Past Surgical History:   Procedure Laterality Date    BACK SURGERY      BUNIONECTOMY Right 4/4/2019    Performed by Tyler Perez DPM at [facility] OR [address] [place] [address]    CARPAL TUNNEL RELEASE      ELBOW SURGERY      EYE SURGERY      KNEE ARTHROSCOPY           Current Outpatient Medications:     amlodipine-benazepril 5-10 mg (LOTREL) 5-10 mg per capsule, Take 1 capsule by mouth once daily., Disp: , Rfl: 0    B-complex with vitamin C (SUPER B COMPLEX-VITAMIN C ORAL), Take 1 tablet by mouth once daily., Disp: , Rfl:     brimonidine 0.2% (ALPHAGAN) 0.2 % Drop, INSTILL ONE DROP INTO EACH EYE TWICE A DAY, Disp: , Rfl: 0    cetirizine (ZYRTEC) 10 MG tablet, Take 10 mg by mouth once daily., Disp: , Rfl: 0    co-enzyme Q-10 30 mg capsule, Take 30 mg by mouth 2 (two) times daily., Disp: , Rfl:     diazePAM (VALIUM) 10 MG Tab, Take 10 mg by mouth nightly., Disp: , Rfl: 1    etodolac (LODINE) 400 MG tablet, Take 400 mg by mouth 2 (two) times daily as needed., Disp: , Rfl: 0    flaxseed oil 1,000 mg Cap, Take 1 capsule by mouth once daily., Disp: , Rfl:     simvastatin (ZOCOR) 20 MG tablet, Take 20 mg by mouth every evening., Disp: , Rfl: 0    vit A/vit C/vit E/zinc/copper (PRESERVISION AREDS ORAL), Take by mouth once daily.,  Disp: , Rfl:     Review of patient's allergies indicates:   Allergen Reactions    Codeine Hives and Itching    Angi Rash       Family History   Family history unknown: Yes       Social History     Socioeconomic History    Marital status:      Spouse name: Not on file    Number of children: Not on file    Years of education: Not on file    Highest education level: Not on file   Occupational History    Not on file   Social Needs    Financial resource strain: Not on file    Food insecurity:     Worry: Not on file     Inability: Not on file    Transportation needs:     Medical: Not on file     Non-medical: Not on file   Tobacco Use    Smoking status: Never Smoker    Smokeless tobacco: Never Used   Substance and Sexual Activity    Alcohol use: Not on file    Drug use: Not on file    Sexual activity: Not on file   Lifestyle    Physical activity:     Days per week: Not on file     Minutes per session: Not on file    Stress: Not on file   Relationships    Social connections:     Talks on phone: Not on file     Gets together: Not on file     Attends Church service: Not on file     Active member of club or organization: Not on file     Attends meetings of clubs or organizations: Not on file     Relationship status: Not on file   Other Topics Concern    Not on file   Social History Narrative    Not on file       Review of Systems -  Constitutional: Denies having night sweats, constant fatigue, loss of appetite or recent substantial weight loss.  Eyes: Denies blurred vision or double vision.  Respiratory: Denies symptoms of shortness of breath, noisy breathing, hoarseness or chronic cough.  GI: Denies symptoms of heartburn, acid regurgitation, or the known presence of a hiatal hernia.  The remainder of a 10-point review of systems is negative    REVIEW OF RADIOLOGICAL FILMS AND RECORDS (PERSONALLY REVIEWED):  CT reviewed - nasal septal deviation, mucous retention cyst, narrowed hiatus  semilunaris    REVIEW OF LABS (PERSONALLY REVIEWED)  Noncontributory    PHYSICAL EXAM:  Vitals - /77   Pulse 77   Temp 97.9 °F (36.6 °C)   Wt 99.3 kg (218 lb 14.7 oz)   BMI 34.29 kg/m²   Constitutional -      General Appearance: well developed, well nourished, without obvious deformities     Communication: speaks with a normal voice without hoarseness  Head & Face -     Overall: no obvious scars, lesions or masses     Parotid and submandibular glands: no masses or tenderness     Facial strength: normal and equal bilaterally  Eyes -      EOM intact  Ear, Nose, Mouth & Throat -     Ears: both left and right external auditory canals and TM's are normal, no external deformities     Nasal exam: mucosa is pink, septum is R-deviated and obstructs any view of R OMC or middle turbinate with 70% obstruction, large inferior spur on L with 40% obstruction. Visible turbinates are 3+ on anterior rhinoscopy     Mastication: teeth appear present     Oral Cavity and oropharynx: mucosa, hard and soft palates, tongue, posterior pharyngeal wall, lips and gums are without lesions. Tonsils appear 2+ and cryptic, tongue is large with Mallampatti III view.  Respiratory:     Breathing unlabored, no stridor  Cardiovascular:     No JVD, capillary refill normal  Larynx: using the mirror for indirect laryngoscopy, the epiglottic, false cords, true cords, and pyriform sinuses are without lesions and the true vocal cords move normally  Neck: appears symmetric, and on palpation is without masses   Endocrine:     Thyroid: no asymmetry, thyromegaly, or thyroid nodules on palpation  Lymphatic:     No cervical lymphadenopathy  Cranial Nerves:      II: Pupillary reflexes normal     III, IV, VI: EOM normal     V: 1,2,3: normal sensation     VII: Normal strength in all divisions     IX, X: Normal voice, palatal elevation and sensation     XI: Shoulder strength normal       XII: Tongue mobility normal  Psychiatric:     Appropriate  affect    NASAL ENDOSCOPY     Indications:  Septal deviation and recurrent acute maxillary sinusitis     Procedure:  With the patient sitting upright, informed consent was obtained.  Topical anesthesia and vasoconstriction was applied to the nares bilaterally.  After waiting an appropriate period of time for anesthesia/vasoconstriction to become effective, an endoscope was passed through both nares, and the nose and nasopharynx were examined.  The patient tolerated the procedure without complications.     Findings: The nasal passageways and nasopharynx appear normal without edema or erythema.  The septum appears R-deviated with L septal spur as above. There is a high mid deviation on the R also. The inferior turbinates appear 3+ There are no intranasal masses or visible mucopus. The Eustachian tubes are patent. The airway is widely patent.      ASSESSMENT: recurrent acute maxillary sinusitis, nasal septal deviation, turbinate hypertrophy, nasal obstruction and JUVENCIO.    PLAN: We discussed continued medical management versus surgery. As he has failed multiple topical and oral treatments he would like to schedule surgery.We discussed a likely 1-week recovery time and the use of postoperative nasal splints. I advised continuing his CPAP and if this is unsuccessful he can see one of my partners who performs JUVENCIO surgery (Catrachito Molina or [facility]).       Jeramy Cuadra

## 2019-09-06 ENCOUNTER — TELEPHONE (OUTPATIENT)
Dept: PREADMISSION TESTING | Facility: HOSPITAL | Age: 59
End: 2019-09-06

## 2019-09-06 DIAGNOSIS — Z01.818 PREOPERATIVE TESTING: Primary | ICD-10-CM

## 2019-09-06 DIAGNOSIS — G57.81 NERVE ENTRAPMENT OF LOWER LIMB, RIGHT: ICD-10-CM

## 2019-09-06 NOTE — TELEPHONE ENCOUNTER
----- Message from Matilda Ramires RN sent at 9/6/2019  9:57 AM CDT -----  9/23/19 PLEASE CALL PATIENT AND SCHEDULE LAB/EKG                           THANKS

## 2019-09-10 ENCOUNTER — HOSPITAL ENCOUNTER (OUTPATIENT)
Dept: CARDIOLOGY | Facility: CLINIC | Age: 59
Discharge: HOME | End: 2019-09-10
Attending: ANESTHESIOLOGY
Payer: COMMERCIAL

## 2019-09-10 ENCOUNTER — LAB VISIT (OUTPATIENT)
Dept: LAB | Facility: HOSPITAL | Age: 59
End: 2019-09-10
Attending: ANESTHESIOLOGY
Payer: COMMERCIAL

## 2019-09-10 DIAGNOSIS — Z01.818 PREOPERATIVE TESTING: ICD-10-CM

## 2019-09-10 DIAGNOSIS — G57.81 NERVE ENTRAPMENT OF LOWER LIMB, RIGHT: ICD-10-CM

## 2019-09-10 LAB
ANION GAP SERPL CALC-SCNC: 7 MMOL/L (ref 8–16)
APTT BLDCRRT: 24.1 SEC (ref 21–32)
BUN SERPL-MCNC: 24 MG/DL (ref 6–20)
CALCIUM SERPL-MCNC: 9.5 MG/DL (ref 8.7–10.5)
CHLORIDE SERPL-SCNC: 105 MMOL/L (ref 95–110)
CO2 SERPL-SCNC: 25 MMOL/L (ref 23–29)
CREAT SERPL-MCNC: 0.9 MG/DL (ref 0.5–1.4)
EST. GFR  (AFRICAN AMERICAN): >60 ML/MIN/1.73 M^2
EST. GFR  (NON AFRICAN AMERICAN): >60 ML/MIN/1.73 M^2
GLUCOSE SERPL-MCNC: 99 MG/DL (ref 70–110)
INR PPP: 0.9 (ref 0.8–1.2)
POTASSIUM SERPL-SCNC: 4.4 MMOL/L (ref 3.5–5.1)
PROTHROMBIN TIME: 9.8 SEC (ref 9–12.5)
SODIUM SERPL-SCNC: 137 MMOL/L (ref 136–145)

## 2019-09-10 PROCEDURE — 85730 THROMBOPLASTIN TIME PARTIAL: CPT

## 2019-09-10 PROCEDURE — 80048 BASIC METABOLIC PNL TOTAL CA: CPT

## 2019-09-10 PROCEDURE — 85610 PROTHROMBIN TIME: CPT

## 2019-09-10 PROCEDURE — 36415 COLL VENOUS BLD VENIPUNCTURE: CPT

## 2019-09-10 PROCEDURE — 93010 ELECTROCARDIOGRAM REPORT: CPT | Mod: S$GLB,,, | Performed by: INTERNAL MEDICINE

## 2019-09-10 PROCEDURE — 93010 EKG 12-LEAD: ICD-10-PCS | Mod: S$GLB,,, | Performed by: INTERNAL MEDICINE

## 2019-09-10 PROCEDURE — 93005 ELECTROCARDIOGRAM TRACING: CPT | Mod: S$GLB,,, | Performed by: ANESTHESIOLOGY

## 2019-09-10 PROCEDURE — 93005 EKG 12-LEAD: ICD-10-PCS | Mod: S$GLB,,, | Performed by: ANESTHESIOLOGY

## 2019-09-20 ENCOUNTER — TELEPHONE (OUTPATIENT)
Dept: OTOLARYNGOLOGY | Facility: CLINIC | Age: 59
End: 2019-09-20

## 2019-09-23 ENCOUNTER — HOSPITAL ENCOUNTER (OUTPATIENT)
Facility: HOSPITAL | Age: 59
Discharge: HOME | End: 2019-09-23
Attending: OTOLARYNGOLOGY | Admitting: OTOLARYNGOLOGY
Payer: COMMERCIAL

## 2019-09-23 ENCOUNTER — ANESTHESIA (OUTPATIENT)
Dept: SURGERY | Facility: HOSPITAL | Age: 59
End: 2019-09-23
Payer: COMMERCIAL

## 2019-09-23 VITALS
WEIGHT: 220 LBS | TEMPERATURE: 98 F | DIASTOLIC BLOOD PRESSURE: 84 MMHG | BODY MASS INDEX: 34.53 KG/M2 | HEIGHT: 67 IN | RESPIRATION RATE: 15 BRPM | OXYGEN SATURATION: 95 % | SYSTOLIC BLOOD PRESSURE: 149 MMHG | HEART RATE: 85 BPM

## 2019-09-23 DIAGNOSIS — J01.01 ACUTE RECURRENT MAXILLARY SINUSITIS: ICD-10-CM

## 2019-09-23 DIAGNOSIS — J01.91 RECURRENT ACUTE SINUSITIS: ICD-10-CM

## 2019-09-23 PROCEDURE — 25000003 PHARM REV CODE 250: Performed by: OTOLARYNGOLOGY

## 2019-09-23 PROCEDURE — 31267 PR NASAL/SINUS ENDOSCOPY,RMV TISS MAXILL SINUS: ICD-10-PCS | Mod: 50,51,, | Performed by: OTOLARYNGOLOGY

## 2019-09-23 PROCEDURE — 63600175 PHARM REV CODE 636 W HCPCS: Performed by: OTOLARYNGOLOGY

## 2019-09-23 PROCEDURE — 25000003 PHARM REV CODE 250: Performed by: NURSE ANESTHETIST, CERTIFIED REGISTERED

## 2019-09-23 PROCEDURE — 30140 PR EXCISION TURBINATE,SUBMUCOUS: ICD-10-PCS | Mod: 50,51,, | Performed by: OTOLARYNGOLOGY

## 2019-09-23 PROCEDURE — 37000008 HC ANESTHESIA 1ST 15 MINUTES: Performed by: OTOLARYNGOLOGY

## 2019-09-23 PROCEDURE — 30520 REPAIR OF NASAL SEPTUM: CPT | Mod: ,,, | Performed by: OTOLARYNGOLOGY

## 2019-09-23 PROCEDURE — 63600175 PHARM REV CODE 636 W HCPCS: Performed by: STUDENT IN AN ORGANIZED HEALTH CARE EDUCATION/TRAINING PROGRAM

## 2019-09-23 PROCEDURE — 63600175 PHARM REV CODE 636 W HCPCS: Performed by: NURSE ANESTHETIST, CERTIFIED REGISTERED

## 2019-09-23 PROCEDURE — 30140 RESECT INFERIOR TURBINATE: CPT | Mod: 50,51,, | Performed by: OTOLARYNGOLOGY

## 2019-09-23 PROCEDURE — 71000033 HC RECOVERY, INTIAL HOUR: Performed by: OTOLARYNGOLOGY

## 2019-09-23 PROCEDURE — 27201423 OPTIME MED/SURG SUP & DEVICES STERILE SUPPLY: Performed by: OTOLARYNGOLOGY

## 2019-09-23 PROCEDURE — 36000708 HC OR TIME LEV III 1ST 15 MIN: Performed by: OTOLARYNGOLOGY

## 2019-09-23 PROCEDURE — 88304 TISSUE SPECIMEN TO PATHOLOGY - SURGERY: ICD-10-PCS | Mod: 26,,, | Performed by: PATHOLOGY

## 2019-09-23 PROCEDURE — 30520 PR REPAIR, NASAL SEPTUM: ICD-10-PCS | Mod: ,,, | Performed by: OTOLARYNGOLOGY

## 2019-09-23 PROCEDURE — D9220A PRA ANESTHESIA: Mod: ANES,,, | Performed by: ANESTHESIOLOGY

## 2019-09-23 PROCEDURE — D9220A PRA ANESTHESIA: Mod: CRNA,,, | Performed by: NURSE ANESTHETIST, CERTIFIED REGISTERED

## 2019-09-23 PROCEDURE — 88304 TISSUE EXAM BY PATHOLOGIST: CPT | Mod: 26,,, | Performed by: PATHOLOGY

## 2019-09-23 PROCEDURE — D9220A PRA ANESTHESIA: ICD-10-PCS | Mod: ANES,,, | Performed by: ANESTHESIOLOGY

## 2019-09-23 PROCEDURE — 36000709 HC OR TIME LEV III EA ADD 15 MIN: Performed by: OTOLARYNGOLOGY

## 2019-09-23 PROCEDURE — 94761 N-INVAS EAR/PLS OXIMETRY MLT: CPT

## 2019-09-23 PROCEDURE — D9220A PRA ANESTHESIA: ICD-10-PCS | Mod: CRNA,,, | Performed by: NURSE ANESTHETIST, CERTIFIED REGISTERED

## 2019-09-23 PROCEDURE — 71000015 HC POSTOP RECOV 1ST HR: Performed by: OTOLARYNGOLOGY

## 2019-09-23 PROCEDURE — 25000003 PHARM REV CODE 250: Performed by: STUDENT IN AN ORGANIZED HEALTH CARE EDUCATION/TRAINING PROGRAM

## 2019-09-23 PROCEDURE — 37000009 HC ANESTHESIA EA ADD 15 MINS: Performed by: OTOLARYNGOLOGY

## 2019-09-23 PROCEDURE — 88304 TISSUE EXAM BY PATHOLOGIST: CPT | Performed by: PATHOLOGY

## 2019-09-23 PROCEDURE — 31267 ENDOSCOPY MAXILLARY SINUS: CPT | Mod: 50,51,, | Performed by: OTOLARYNGOLOGY

## 2019-09-23 RX ORDER — CEFAZOLIN SODIUM 1 G/3ML
2 INJECTION, POWDER, FOR SOLUTION INTRAMUSCULAR; INTRAVENOUS
Status: COMPLETED | OUTPATIENT
Start: 2019-09-23 | End: 2019-09-23

## 2019-09-23 RX ORDER — FENTANYL CITRATE 50 UG/ML
25 INJECTION, SOLUTION INTRAMUSCULAR; INTRAVENOUS EVERY 5 MIN PRN
Status: DISCONTINUED | OUTPATIENT
Start: 2019-09-23 | End: 2019-09-23 | Stop reason: HOSPADM

## 2019-09-23 RX ORDER — ONDANSETRON 2 MG/ML
INJECTION INTRAMUSCULAR; INTRAVENOUS
Status: DISCONTINUED | OUTPATIENT
Start: 2019-09-23 | End: 2019-09-23

## 2019-09-23 RX ORDER — ONDANSETRON 4 MG/1
8 TABLET, ORALLY DISINTEGRATING ORAL EVERY 12 HOURS PRN
Qty: 15 TABLET | Refills: 0 | OUTPATIENT
Start: 2019-09-23 | End: 2019-09-23 | Stop reason: SDUPTHER

## 2019-09-23 RX ORDER — LIDOCAINE HYDROCHLORIDE 10 MG/ML
1 INJECTION, SOLUTION EPIDURAL; INFILTRATION; INTRACAUDAL; PERINEURAL ONCE
Status: DISCONTINUED | OUTPATIENT
Start: 2019-09-23 | End: 2019-09-23 | Stop reason: HOSPADM

## 2019-09-23 RX ORDER — PHENYLEPHRINE HYDROCHLORIDE 10 MG/ML
INJECTION INTRAVENOUS
Status: DISCONTINUED | OUTPATIENT
Start: 2019-09-23 | End: 2019-09-23

## 2019-09-23 RX ORDER — ONDANSETRON 2 MG/ML
4 INJECTION INTRAMUSCULAR; INTRAVENOUS DAILY PRN
Status: DISCONTINUED | OUTPATIENT
Start: 2019-09-23 | End: 2019-09-23 | Stop reason: HOSPADM

## 2019-09-23 RX ORDER — SODIUM CHLORIDE 9 MG/ML
INJECTION, SOLUTION INTRAVENOUS CONTINUOUS PRN
Status: DISCONTINUED | OUTPATIENT
Start: 2019-09-23 | End: 2019-09-23

## 2019-09-23 RX ORDER — MIDAZOLAM HYDROCHLORIDE 1 MG/ML
INJECTION, SOLUTION INTRAMUSCULAR; INTRAVENOUS
Status: DISCONTINUED | OUTPATIENT
Start: 2019-09-23 | End: 2019-09-23

## 2019-09-23 RX ORDER — SODIUM CHLORIDE 0.9 % (FLUSH) 0.9 %
10 SYRINGE (ML) INJECTION
Status: DISCONTINUED | OUTPATIENT
Start: 2019-09-23 | End: 2019-09-23 | Stop reason: HOSPADM

## 2019-09-23 RX ORDER — OXYCODONE AND ACETAMINOPHEN 5; 325 MG/1; MG/1
1 TABLET ORAL EVERY 6 HOURS PRN
Qty: 15 TABLET | Refills: 0 | Status: SHIPPED | OUTPATIENT
Start: 2019-09-23 | End: 2019-09-23 | Stop reason: SDUPTHER

## 2019-09-23 RX ORDER — PROPOFOL 10 MG/ML
VIAL (ML) INTRAVENOUS
Status: DISCONTINUED | OUTPATIENT
Start: 2019-09-23 | End: 2019-09-23

## 2019-09-23 RX ORDER — LIDOCAINE HCL/PF 100 MG/5ML
SYRINGE (ML) INTRAVENOUS
Status: DISCONTINUED | OUTPATIENT
Start: 2019-09-23 | End: 2019-09-23

## 2019-09-23 RX ORDER — DEXMEDETOMIDINE HYDROCHLORIDE 100 UG/ML
INJECTION, SOLUTION INTRAVENOUS
Status: DISCONTINUED | OUTPATIENT
Start: 2019-09-23 | End: 2019-09-23

## 2019-09-23 RX ORDER — ONDANSETRON 4 MG/1
8 TABLET, ORALLY DISINTEGRATING ORAL EVERY 12 HOURS PRN
Qty: 15 TABLET | Refills: 0 | Status: SHIPPED | OUTPATIENT
Start: 2019-09-23 | End: 2021-03-23

## 2019-09-23 RX ORDER — OXYCODONE AND ACETAMINOPHEN 5; 325 MG/1; MG/1
1 TABLET ORAL EVERY 6 HOURS PRN
Qty: 15 TABLET | Refills: 0 | Status: SHIPPED | OUTPATIENT
Start: 2019-09-23 | End: 2021-03-23

## 2019-09-23 RX ORDER — CEPHALEXIN 250 MG/1
250 CAPSULE ORAL EVERY 8 HOURS
Qty: 21 CAPSULE | Refills: 0 | OUTPATIENT
Start: 2019-09-23 | End: 2019-09-23 | Stop reason: SDUPTHER

## 2019-09-23 RX ORDER — MUPIROCIN 20 MG/G
OINTMENT TOPICAL
Status: DISCONTINUED | OUTPATIENT
Start: 2019-09-23 | End: 2019-09-23 | Stop reason: HOSPADM

## 2019-09-23 RX ORDER — OXYCODONE AND ACETAMINOPHEN 5; 325 MG/1; MG/1
TABLET ORAL
Status: DISCONTINUED
Start: 2019-09-23 | End: 2019-09-23 | Stop reason: HOSPADM

## 2019-09-23 RX ORDER — EPHEDRINE SULFATE 50 MG/ML
INJECTION, SOLUTION INTRAVENOUS
Status: DISCONTINUED | OUTPATIENT
Start: 2019-09-23 | End: 2019-09-23

## 2019-09-23 RX ORDER — EPINEPHRINE CONVENIENCE KIT 1 MG/ML(1)
KIT INTRAMUSCULAR; SUBCUTANEOUS
Status: DISCONTINUED | OUTPATIENT
Start: 2019-09-23 | End: 2019-09-23 | Stop reason: HOSPADM

## 2019-09-23 RX ORDER — LIDOCAINE HYDROCHLORIDE AND EPINEPHRINE 10; 10 MG/ML; UG/ML
INJECTION, SOLUTION INFILTRATION; PERINEURAL
Status: DISCONTINUED | OUTPATIENT
Start: 2019-09-23 | End: 2019-09-23 | Stop reason: HOSPADM

## 2019-09-23 RX ORDER — CEPHALEXIN 250 MG/1
250 CAPSULE ORAL EVERY 8 HOURS
Qty: 21 CAPSULE | Refills: 0 | Status: SHIPPED | OUTPATIENT
Start: 2019-09-23 | End: 2019-09-30

## 2019-09-23 RX ORDER — ROCURONIUM BROMIDE 10 MG/ML
INJECTION, SOLUTION INTRAVENOUS
Status: DISCONTINUED | OUTPATIENT
Start: 2019-09-23 | End: 2019-09-23

## 2019-09-23 RX ORDER — OXYCODONE AND ACETAMINOPHEN 5; 325 MG/1; MG/1
1 TABLET ORAL EVERY 4 HOURS PRN
Status: DISCONTINUED | OUTPATIENT
Start: 2019-09-23 | End: 2019-09-23 | Stop reason: HOSPADM

## 2019-09-23 RX ORDER — FENTANYL CITRATE 50 UG/ML
INJECTION, SOLUTION INTRAMUSCULAR; INTRAVENOUS
Status: DISCONTINUED | OUTPATIENT
Start: 2019-09-23 | End: 2019-09-23

## 2019-09-23 RX ORDER — DEXAMETHASONE SODIUM PHOSPHATE 4 MG/ML
8 INJECTION, SOLUTION INTRA-ARTICULAR; INTRALESIONAL; INTRAMUSCULAR; INTRAVENOUS; SOFT TISSUE
Status: DISCONTINUED | OUTPATIENT
Start: 2019-09-23 | End: 2019-09-23 | Stop reason: HOSPADM

## 2019-09-23 RX ADMIN — FENTANYL CITRATE 25 MCG: 50 INJECTION, SOLUTION INTRAMUSCULAR; INTRAVENOUS at 11:09

## 2019-09-23 RX ADMIN — FENTANYL CITRATE 100 MCG: 50 INJECTION, SOLUTION INTRAMUSCULAR; INTRAVENOUS at 10:09

## 2019-09-23 RX ADMIN — PROPOFOL 30 MG: 10 INJECTION, EMULSION INTRAVENOUS at 10:09

## 2019-09-23 RX ADMIN — EPHEDRINE SULFATE 10 MG: 50 INJECTION, SOLUTION INTRAMUSCULAR; INTRAVENOUS; SUBCUTANEOUS at 11:09

## 2019-09-23 RX ADMIN — CEFAZOLIN 2 G: 330 INJECTION, POWDER, FOR SOLUTION INTRAMUSCULAR; INTRAVENOUS at 11:09

## 2019-09-23 RX ADMIN — PROPOFOL 120 MG: 10 INJECTION, EMULSION INTRAVENOUS at 10:09

## 2019-09-23 RX ADMIN — MIDAZOLAM HYDROCHLORIDE 2 MG: 1 INJECTION, SOLUTION INTRAMUSCULAR; INTRAVENOUS at 10:09

## 2019-09-23 RX ADMIN — DEXMEDETOMIDINE HYDROCHLORIDE 8 MCG: 100 INJECTION, SOLUTION, CONCENTRATE INTRAVENOUS at 11:09

## 2019-09-23 RX ADMIN — EPHEDRINE SULFATE 15 MG: 50 INJECTION, SOLUTION INTRAMUSCULAR; INTRAVENOUS; SUBCUTANEOUS at 11:09

## 2019-09-23 RX ADMIN — ROCURONIUM BROMIDE 50 MG: 10 INJECTION, SOLUTION INTRAVENOUS at 10:09

## 2019-09-23 RX ADMIN — OXYCODONE HYDROCHLORIDE AND ACETAMINOPHEN 1 TABLET: 5; 325 TABLET ORAL at 12:09

## 2019-09-23 RX ADMIN — PHENYLEPHRINE HYDROCHLORIDE 200 MCG: 10 INJECTION INTRAVENOUS at 12:09

## 2019-09-23 RX ADMIN — ONDANSETRON 4 MG: 2 INJECTION INTRAMUSCULAR; INTRAVENOUS at 11:09

## 2019-09-23 RX ADMIN — DEXAMETHASONE SODIUM PHOSPHATE 8 MG: 4 INJECTION, SOLUTION INTRAMUSCULAR; INTRAVENOUS at 10:09

## 2019-09-23 RX ADMIN — LIDOCAINE HYDROCHLORIDE 100 MG: 20 INJECTION, SOLUTION INTRAVENOUS at 10:09

## 2019-09-23 RX ADMIN — SODIUM CHLORIDE: 0.9 INJECTION, SOLUTION INTRAVENOUS at 10:09

## 2019-09-23 NOTE — PLAN OF CARE
Discharge instructions given and explained to patient and family with verbalization of understanding all instructions. Prescription given/pharmacy called and RN explained next time and doses of each medication. Patients v/s stable, denies n/v and tolerating po, rates pain level tolerable, IV removed, and wife at bedside for patient discharge home. Awaiting bedside delivery for discharge home.

## 2019-09-23 NOTE — DISCHARGE INSTRUCTIONS
Recovery After Procedural Sedation (Adult)  You have been given medicine by vein to make you sleep during your surgery. This may have included both a pain medicine and sleeping medicine. Most of the effects have worn off. But you may still have some drowsiness for the next 6 to 8 hours.  Home care  Follow these guidelines when you get home:  · For the next 8 hours, you should be watched by a responsible adult. This person should make sure your condition is not getting worse.  · Don't drink any alcohol for the next 24 hours.  · Don't drive, operate dangerous machinery, or make important business or personal decisions during the next 24 hours.  Note: Your healthcare provider may tell you not to take any medicine by mouth for pain or sleep in the next 4 hours. These medicines may react with the medicines you were given in the hospital. This could cause a much stronger response than usual.  Follow-up care  Follow up with your healthcare provider if you are not alert and back to your usual level of activity within 12 hours.  When to seek medical advice  Call your healthcare provider right away if any of these occur:  · Drowsiness gets worse  · Weakness or dizziness gets worse  · Repeated vomiting  · You can't be awakened   Date Last Reviewed: 10/18/2016  © 7521-9500 [employer]. [address] [place] [address], [place] [address]. All rights reserved. This information is not intended as a substitute for professional medical care. Always follow your healthcare professional's instructions.      Nasal Surgery: Your Recovery  Youve just had nasal surgery. During the first weeks after surgery, be sure to follow the advice of your doctor. The tips on this sheet can help speed your recovery.  Tips for healing  · Dont take medicines containing aspirin or ibuprofen.  · Don't bump your nose or touch the splint or packing.  · Don't bend or lift.  · Sneeze or cough with your mouth open to reduce pressure inside your  nose.  · Keep from blowing your nose until youre told its OK to do so.  · Keep eyeglasses from resting on your nose by taping them above the nose.  · Protect your nose from the sun.  · After packing is removed, start saltwater rinses if prescribed.  · Keep follow-up appointments with your doctor.  Follow-up visits  Your doctor will most likely want to see you within a week to check your healing. Any packing, splint, or dressings will probably be removed at that time. You may feel slight discomfort and bleed a little when this is done.  Assessing the results  During later follow-up visits, your doctor will assess your healing and the results of your surgery. Talk with your doctor about any problems or concerns you may have.  When to call the doctor  Call the doctor if you notice any of the following:  · Sudden increase in pain, swelling, or bruising  · Fever  · Heavy bleeding  · Yellow or greenish drainage from the nose  · Unrelieved headache  · Decreased or double vision  · Stiff neck or very tired feeling   Date Last Reviewed: 11/1/2016 © 2000-2017 [employer]. [address] [place] [address], [place] [address]. All rights reserved. This information is not intended as a substitute for professional medical care. Always follow your healthcare professional's instructions.

## 2019-09-23 NOTE — ANESTHESIA POSTPROCEDURE EVALUATION
Anesthesia Post Evaluation    Patient: Willie Roach    Procedure(s) Performed: Procedure(s) (LRB):  FESS (FUNCTIONAL ENDOSCOPIC SINUS SURGERY) (Bilateral)  SEPTOPLASTY, NOSE (N/A)  REDUCTION, NASAL TURBINATE (Bilateral)    Final Anesthesia Type: general  Patient location during evaluation: PACU  Patient participation: Yes- Able to Participate  Level of consciousness: awake and alert  Post-procedure vital signs: reviewed and stable  Pain management: adequate  Airway patency: patent  PONV status at discharge: No PONV  Anesthetic complications: no      Cardiovascular status: hemodynamically stable  Respiratory status: unassisted  Hydration status: euvolemic  Follow-up not needed.          Vitals Value Taken Time   /78 9/23/2019  1:09 PM   Temp 36.4 °C (97.5 °F) 9/23/2019 12:17 PM   Pulse 83 9/23/2019  1:09 PM   Resp 15 9/23/2019  1:05 PM   SpO2 97 % 9/23/2019  1:09 PM   Vitals shown include unvalidated device data.      Event Time     Out of Recovery 13:00:00          Pain/Patrick Score: Pain Rating Prior to Med Admin: 4 (9/23/2019 12:44 PM)  Patrick Score: 10 (9/23/2019  1:05 PM)

## 2019-09-23 NOTE — OP NOTE
DATE OF PROCEDURE:  09/23/2019    SURGEON:  Jeramy Cuadra M.D.    ASSISTANT SURGEON:  Nilson Cortez (RES)    ANESTHESIA:  General endotracheal anesthesia.    PROCEDURES PERFORMED:  1.  Functional endoscopic sinus surgery with bilateral medial maxillary   antrostomies with removal of sinus contents.  2.  Septoplasty, endoscopic.  3.  Bilateral reduction of inferior turbinates.    INDICATIONS FOR PROCEDURE AND PREOPERATIVE DIAGNOSIS:  This is a 59-year-old   gentleman who has longstanding refractory postnasal drip, a septal deviation and   recurrent acute sinusitis requiring multiple courses of antibiotics annually.    He presents now for definitive treatment.    POSTOPERATIVE DIAGNOSIS:  This is a 59-year-old gentleman who has longstanding   refractory postnasal drip, a septal deviation and recurrent acute sinusitis   requiring multiple courses of antibiotics annually.  He presents now for   definitive treatment.    PROCEDURE IN DETAIL:  After obtaining informed consent, the patient was taken to   the Operating Room in supine position.  General endotracheal anesthesia was   induced without difficulty.  The area was prepped and draped in standard sterile   fashion.  Then, 4% cocaine was then placed on topical pledgets and placed   intranasally.  Sufficient time was allowed for this to take effect.  Then, 1%   lidocaine with 1:100,000 epinephrine was injected into the heads of the inferior   turbinates into the septum and the anterior inferior left-sided septal spur and   then the sphenopalatine artery bilaterally.  Sufficient time was allowed for   this to take effect.  The pledgets were removed utilizing a 0-degree endoscope,   the nose was examined.  The turbinates were found to be 4+ in size.  An anterior   stab incision was made in the head.  The turbinate shaver was then used to   aggressively remove the submucosal erectile tissue and the turbinates were then   outfractured with a Goldmann elevator.  A #15 blade  used.  This was then used to   make an incision along the free edge of the septal spur and a Dorys elevator   was used to raise a very short superior and inferior septal flaps.  A #15 blade   was then used to cut the deviated cartilage back so that the septum was in its   normal midline position and an open Caldwell-River tensor was then used to   resect some deviated bone of the maxillary crest.  The maxillary crest was then   medialized with an infracture first on the left and then on the right.  A sickle   knife was used to incise the uncinate process.  A ball-tip probe was then used   to enter the hiatus semilunaris and deflect the uncinate medially.  Brynn   forceps was then used to grasp the uncinate and remove it, which was sent for   permanent histopathology.  The medial maxillary antrum was then identified and   the antrostomy was then made with powered instrumentation.  The area was then   copiously irrigated.  Milan splints were coated in bacitracin and then placed   transnasally and secured with a 3-0 nylon suture after first closing the   septoplasty incision with simple interrupted 4-0 chromic gut suture.  This   concluded the procedure.  The patient was then rotated back to anesthesia,   awakened in the Operating Room without difficulty.  There were no complications   and estimated blood loss was 10 mL.      RACHEL/IN  dd: 09/23/2019 13:12:04 (FELY)  td: 09/23/2019 14:33:43 (FELY)  Doc ID   #[id number]  Job ID #[id number]    CC:

## 2019-09-23 NOTE — TRANSFER OF CARE
"Anesthesia Transfer of Care Note    Patient: Willie Roach    Procedure(s) Performed: Procedure(s) (LRB):  FESS (FUNCTIONAL ENDOSCOPIC SINUS SURGERY) (Bilateral)  SEPTOPLASTY, NOSE (N/A)  REDUCTION, NASAL TURBINATE (Bilateral)    Patient location: PACU    Anesthesia Type: general    Transport from OR: Transported from OR on 6-10 L/min O2 by face mask with adequate spontaneous ventilation    Post pain: adequate analgesia    Post assessment: no apparent anesthetic complications and tolerated procedure well    Post vital signs: stable    Level of consciousness: sedated    Nausea/Vomiting: no nausea/vomiting    Complications: none          Last vitals:   Visit Vitals  /67 (BP Location: Left arm, Patient Position: Lying)   Pulse 78   Temp 36.4 °C (97.5 °F) (Temporal)   Resp 16   Ht 5' 7" (1.702 m)   Wt 99.8 kg (220 lb)   SpO2 96%   BMI 34.46 kg/m²     "

## 2019-09-27 ENCOUNTER — TELEPHONE (OUTPATIENT)
Dept: OTOLARYNGOLOGY | Facility: CLINIC | Age: 59
End: 2019-09-27

## 2019-09-27 NOTE — TELEPHONE ENCOUNTER
----- Message from Holly Mayberry sent at 9/27/2019 10:58 AM CDT -----  Contact: pt  Type:  Needs Medical Advice    Who Called:pt    Symptoms (please be specific): constant headache and sinus stuffy nose    How long has patient had these symptoms:  After surgery    Would the patient rather a call back or a response via [facility]? Call back    Best Call Back Number: [phone]    Additional Information: Pt wants to know if this normal.

## 2019-10-01 ENCOUNTER — OFFICE VISIT (OUTPATIENT)
Dept: OTOLARYNGOLOGY | Facility: CLINIC | Age: 59
End: 2019-10-01
Payer: COMMERCIAL

## 2019-10-01 VITALS
HEART RATE: 92 BPM | DIASTOLIC BLOOD PRESSURE: 79 MMHG | BODY MASS INDEX: 33.84 KG/M2 | SYSTOLIC BLOOD PRESSURE: 127 MMHG | WEIGHT: 216.06 LBS | TEMPERATURE: 98 F

## 2019-10-01 DIAGNOSIS — J01.01 ACUTE RECURRENT MAXILLARY SINUSITIS: Primary | ICD-10-CM

## 2019-10-01 DIAGNOSIS — J34.2 NASAL SEPTAL DEVIATION: ICD-10-CM

## 2019-10-01 PROBLEM — J01.91 RECURRENT ACUTE SINUSITIS: Status: RESOLVED | Noted: 2019-09-23 | Resolved: 2019-10-01

## 2019-10-01 PROCEDURE — 3008F BODY MASS INDEX DOCD: CPT | Mod: CPTII,S$GLB,, | Performed by: OTOLARYNGOLOGY

## 2019-10-01 PROCEDURE — 99999 PR PBB SHADOW E&M-EST. PATIENT-LVL III: ICD-10-PCS | Mod: PBBFAC,,, | Performed by: OTOLARYNGOLOGY

## 2019-10-01 PROCEDURE — 3008F PR BODY MASS INDEX (BMI) DOCUMENTED: ICD-10-PCS | Mod: CPTII,S$GLB,, | Performed by: OTOLARYNGOLOGY

## 2019-10-01 PROCEDURE — 31237 PR NASAL/SINUS ENDOSCOPY,BX/RMV POLYP/DEBRID: ICD-10-PCS | Mod: 50,79,S$GLB, | Performed by: OTOLARYNGOLOGY

## 2019-10-01 PROCEDURE — 99024 PR POST-OP FOLLOW-UP VISIT: ICD-10-PCS | Mod: S$GLB,,, | Performed by: OTOLARYNGOLOGY

## 2019-10-01 PROCEDURE — 99999 PR PBB SHADOW E&M-EST. PATIENT-LVL III: CPT | Mod: PBBFAC,,, | Performed by: OTOLARYNGOLOGY

## 2019-10-01 PROCEDURE — 31237 NSL/SINS NDSC SURG BX POLYPC: CPT | Mod: 50,79,S$GLB, | Performed by: OTOLARYNGOLOGY

## 2019-10-01 PROCEDURE — 99024 POSTOP FOLLOW-UP VISIT: CPT | Mod: S$GLB,,, | Performed by: OTOLARYNGOLOGY

## 2019-10-01 NOTE — PROGRESS NOTES
HEAD AND NECK SURGICAL ONCOLOGY CLINIC NOTE    CC: F/U FESS/Septoplasty    TREATMENT HISTORY:  1. FESS/Septo/SMRT 9/23/2019    INTERVAL HISTORY:Willie Roach returns to the Head and Neck Surgical Oncology Clinic for follow-up of FESS. No complaints today. Denies fevers, chills, and nightsweats. There has not been any redness or drainage from the wound. He is here for his first debridement and splint removal. Reports expected degree of facial pressure and nasal congestion. No epistaxis or rhinorrhea.    Exam:  Septum midline, splints removed.  First debridement performed with removal of bilateral crusting. OMCs patent.  Good nasal airway    A/P: Doing very well and notes significant improvement in nasal breathing.

## 2019-11-15 ENCOUNTER — OFFICE VISIT (OUTPATIENT)
Dept: OTOLARYNGOLOGY | Facility: CLINIC | Age: 59
End: 2019-11-15
Payer: COMMERCIAL

## 2019-11-15 VITALS
HEART RATE: 70 BPM | BODY MASS INDEX: 34.46 KG/M2 | WEIGHT: 220 LBS | SYSTOLIC BLOOD PRESSURE: 122 MMHG | DIASTOLIC BLOOD PRESSURE: 76 MMHG

## 2019-11-15 DIAGNOSIS — R09.A2 GLOBUS PHARYNGEUS: ICD-10-CM

## 2019-11-15 DIAGNOSIS — J30.9 ALLERGIC RHINITIS, UNSPECIFIED SEASONALITY, UNSPECIFIED TRIGGER: Primary | ICD-10-CM

## 2019-11-15 DIAGNOSIS — K21.9 LARYNGOPHARYNGEAL REFLUX (LPR): ICD-10-CM

## 2019-11-15 PROCEDURE — 31231 NASAL ENDOSCOPY DX: CPT | Mod: 59,79,S$GLB, | Performed by: OTOLARYNGOLOGY

## 2019-11-15 PROCEDURE — 99999 PR PBB SHADOW E&M-EST. PATIENT-LVL III: ICD-10-PCS | Mod: PBBFAC,,, | Performed by: OTOLARYNGOLOGY

## 2019-11-15 PROCEDURE — 31231 PR NASAL ENDOSCOPY, DX: ICD-10-PCS | Mod: 59,79,S$GLB, | Performed by: OTOLARYNGOLOGY

## 2019-11-15 PROCEDURE — 3008F BODY MASS INDEX DOCD: CPT | Mod: CPTII,S$GLB,, | Performed by: OTOLARYNGOLOGY

## 2019-11-15 PROCEDURE — 99214 OFFICE O/P EST MOD 30 MIN: CPT | Mod: 25,24,S$GLB, | Performed by: OTOLARYNGOLOGY

## 2019-11-15 PROCEDURE — 3008F PR BODY MASS INDEX (BMI) DOCUMENTED: ICD-10-PCS | Mod: CPTII,S$GLB,, | Performed by: OTOLARYNGOLOGY

## 2019-11-15 PROCEDURE — 99999 PR PBB SHADOW E&M-EST. PATIENT-LVL III: CPT | Mod: PBBFAC,,, | Performed by: OTOLARYNGOLOGY

## 2019-11-15 PROCEDURE — 99214 PR OFFICE/OUTPT VISIT, EST, LEVL IV, 30-39 MIN: ICD-10-PCS | Mod: 25,24,S$GLB, | Performed by: OTOLARYNGOLOGY

## 2019-11-15 PROCEDURE — 31575 PR LARYNGOSCOPY, FLEXIBLE; DIAGNOSTIC: ICD-10-PCS | Mod: 79,S$GLB,, | Performed by: OTOLARYNGOLOGY

## 2019-11-15 PROCEDURE — 31575 DIAGNOSTIC LARYNGOSCOPY: CPT | Mod: 79,S$GLB,, | Performed by: OTOLARYNGOLOGY

## 2019-11-15 RX ORDER — PANTOPRAZOLE SODIUM 40 MG/1
40 TABLET, DELAYED RELEASE ORAL DAILY
Qty: 30 TABLET | Refills: 11 | Status: SHIPPED | OUTPATIENT
Start: 2019-11-15 | End: 2021-03-24 | Stop reason: ALTCHOICE

## 2019-11-15 NOTE — PROGRESS NOTES
HEAD AND NECK SURGICAL ONCOLOGY CLINIC NOTE     CC: F/U FESS/Septoplasty     TREATMENT HISTORY:  1. FESS/Septo/SMRT 9/23/2019     INTERVAL HISTORY:Willie Roach returns to the [facility] Neck Surgical Oncology Clinic for follow-up of FESS. No complaints today. Denies fevers, chills, and nightsweats. Is bothered by frequent throat-clearing, globus and voice coarseness. He occasionally coughs up thick-appearing mucus and has intermittent PND. His nasal breathing is good and he denies facial pain or pressure.      Exam:  OC/OP normal, no masses  Septum with slight residual L-deviation, turbinates 1+ after decongestion  Good nasal airway  No neck masses  Voice strong    NASAL ENDOSCOPY     Indications:  PND     Procedure:  With the patient sitting upright, informed consent was obtained.  Topical anesthesia and vasoconstriction was applied to the nares bilaterally.  After waiting an appropriate period of time for anesthesia/vasoconstriction to become effective, an endoscope was passed through both nares, and the nose and nasopharynx were examined.  The patient tolerated the procedure without complications.     Findings: The nasal passageways and nasopharynx appear normal without edema or erythema.  The septum appears L-deviated The inferior turbinates appear 1_ There are no intranasal masses or visible mucopus. Wide maxillary antrostomies present, small synichial band on R between anterior ethmoid bulla and middle turbinate. The Eustachian tubes are patent. The airway is widely patent.    FLEXIBLE LARYNGOSCOPY     Indications:  Globus, throat clearing     Procedure:  With the patient sitting upright, informed consent was obtained.  Topical anesthesia and vasoconstriction was applied to both nares.  After waiting an appropriate period of time for anesthesia/vasoconstriction to become effective, a flexible laryngoscope was passed through the L side(s) of the nose and the nose, nasopharynx, oropharynx, hypopharynx and  larynx were examined.  The patient tolerated the procedure without complications.     Findings: The nasal passageways and nasopharynx appear normal without edema or erythema.  The oropharynx, base of tongue, piriform sinuses, epiglottis, and aryepiglottic folds were examined and no masses, lesions, or ulcerations were seen.  The true vocal cords move well to midline bilaterally.  The airway is widely patent.       A/P: No evidence of acute sinusitis. Encouraged Ocean Spray use. Regarding his globus and throat-clearing, I suspect there is a component of LP and will begin Pantoprazole 40mg QD for a 1-month trial.

## 2019-12-20 ENCOUNTER — OFFICE VISIT (OUTPATIENT)
Dept: OTOLARYNGOLOGY | Facility: CLINIC | Age: 59
End: 2019-12-20
Payer: COMMERCIAL

## 2019-12-20 VITALS — HEART RATE: 64 BPM | TEMPERATURE: 98 F | SYSTOLIC BLOOD PRESSURE: 123 MMHG | DIASTOLIC BLOOD PRESSURE: 80 MMHG

## 2019-12-20 DIAGNOSIS — J30.9 ALLERGIC RHINITIS, UNSPECIFIED SEASONALITY, UNSPECIFIED TRIGGER: Primary | ICD-10-CM

## 2019-12-20 DIAGNOSIS — K21.9 LARYNGOPHARYNGEAL REFLUX (LPR): ICD-10-CM

## 2019-12-20 PROCEDURE — 31575 DIAGNOSTIC LARYNGOSCOPY: CPT | Mod: 79,S$GLB,, | Performed by: OTOLARYNGOLOGY

## 2019-12-20 PROCEDURE — 99999 PR PBB SHADOW E&M-EST. PATIENT-LVL III: CPT | Mod: PBBFAC,,, | Performed by: OTOLARYNGOLOGY

## 2019-12-20 PROCEDURE — 31575 PR LARYNGOSCOPY, FLEXIBLE; DIAGNOSTIC: ICD-10-PCS | Mod: 79,S$GLB,, | Performed by: OTOLARYNGOLOGY

## 2019-12-20 PROCEDURE — 99999 PR PBB SHADOW E&M-EST. PATIENT-LVL III: ICD-10-PCS | Mod: PBBFAC,,, | Performed by: OTOLARYNGOLOGY

## 2019-12-20 PROCEDURE — 31231 PR NASAL ENDOSCOPY, DX: ICD-10-PCS | Mod: 59,79,S$GLB, | Performed by: OTOLARYNGOLOGY

## 2019-12-20 PROCEDURE — 31231 NASAL ENDOSCOPY DX: CPT | Mod: 59,79,S$GLB, | Performed by: OTOLARYNGOLOGY

## 2019-12-20 PROCEDURE — 99214 PR OFFICE/OUTPT VISIT, EST, LEVL IV, 30-39 MIN: ICD-10-PCS | Mod: 25,24,S$GLB, | Performed by: OTOLARYNGOLOGY

## 2019-12-20 PROCEDURE — 99214 OFFICE O/P EST MOD 30 MIN: CPT | Mod: 25,24,S$GLB, | Performed by: OTOLARYNGOLOGY

## 2019-12-20 RX ORDER — FLUTICASONE PROPIONATE 50 MCG
2 SPRAY, SUSPENSION (ML) NASAL DAILY
Qty: 1 BOTTLE | Refills: 5 | Status: SHIPPED | OUTPATIENT
Start: 2019-12-20 | End: 2020-01-19

## 2019-12-20 NOTE — PROGRESS NOTES
HEAD AND NECK SURGICAL ONCOLOGY CLINIC NOTE     CC: F/U FESS/Septoplasty     TREATMENT HISTORY:  1. FESS/Septo/SMRT 9/23/2019     INTERVAL HISTORY:Willie Roach returns to the [facility] Ne[facility] Surgical Oncology Clinic for follow-up of FESS. No complaints today. Denies fevers, chills, and nightsweats. His frequent throat-clearing, globus and voice coarseness have resolved, he remains bothered by PND that has not abated with [place] Spray.     Exam:  OC/OP normal, no masses  Septum with slight residual R-deviation, turbinates 1+ after decongestion but 4+ and cobblestoned on initial exam  Good nasal airway  No neck masses  Voice strong     NASAL ENDOSCOPY     Indications:  PND     Procedure:  With the patient sitting upright, informed consent was obtained.  Topical anesthesia and vasoconstriction was applied to the nares bilaterally.  After waiting an appropriate period of time for anesthesia/vasoconstriction to become effective, an endoscope was passed through both nares, and the nose and nasopharynx were examined.  The patient tolerated the procedure without complications.     Findings: The nasal passageways and nasopharynx appear normal without edema or erythema.  The septum appears L-deviated The inferior turbinates appear 4+ and cobblestoned. There are no intranasal masses or visible mucopus. Wide maxillary antrostomies present, small synichial band on R between anterior ethmoid bulla and middle turbinate. The Eustachian tubes are patent. The airway is widely patent.      FLEXIBLE LARYNGOSCOPY     Indications:  LPR     Procedure:  With the patient sitting upright, informed consent was obtained.  Topical anesthesia and vasoconstriction was applied to both nares.  After waiting an appropriate period of time for anesthesia/vasoconstriction to become effective, a flexible laryngoscope was passed through the L side(s) of the nose and the nose, nasopharynx, oropharynx, hypopharynx and larynx were examined.  The patient  tolerated the procedure without complications.     Findings: The nasal passageways and nasopharynx appear normal without edema or erythema.  The oropharynx, base of tongue, piriform sinuses, epiglottis, and aryepiglottic folds were examined and no masses, lesions, or ulcerations were seen.  The true vocal cords move well to midline bilaterally.  The airway is widely patent.       A/P: Allergic rhinitis. I will begin Flonase for a 1 month trial. We discussed that different people respond better to various topical nasal steroids, so we will begin with this generic before moving on to another if it is unsuccessful.

## 2020-02-19 ENCOUNTER — OFFICE VISIT (OUTPATIENT)
Dept: OTOLARYNGOLOGY | Facility: CLINIC | Age: 60
End: 2020-02-19
Payer: COMMERCIAL

## 2020-02-19 VITALS
DIASTOLIC BLOOD PRESSURE: 81 MMHG | HEART RATE: 71 BPM | WEIGHT: 222 LBS | BODY MASS INDEX: 34.77 KG/M2 | SYSTOLIC BLOOD PRESSURE: 138 MMHG

## 2020-02-19 DIAGNOSIS — R05.9 COUGH: ICD-10-CM

## 2020-02-19 DIAGNOSIS — R09.A2 GLOBUS PHARYNGEUS: ICD-10-CM

## 2020-02-19 DIAGNOSIS — K21.9 LPRD (LARYNGOPHARYNGEAL REFLUX DISEASE): ICD-10-CM

## 2020-02-19 DIAGNOSIS — J32.0 CHRONIC MAXILLARY SINUSITIS: ICD-10-CM

## 2020-02-19 DIAGNOSIS — R09.82 POSTNASAL DRIP: ICD-10-CM

## 2020-02-19 DIAGNOSIS — J30.9 ALLERGIC RHINITIS, UNSPECIFIED SEASONALITY, UNSPECIFIED TRIGGER: Primary | ICD-10-CM

## 2020-02-19 PROCEDURE — 31231 NASAL ENDOSCOPY DX: CPT | Mod: 59,S$GLB,, | Performed by: OTOLARYNGOLOGY

## 2020-02-19 PROCEDURE — 99213 OFFICE O/P EST LOW 20 MIN: CPT | Mod: 25,S$GLB,, | Performed by: OTOLARYNGOLOGY

## 2020-02-19 PROCEDURE — 31575 DIAGNOSTIC LARYNGOSCOPY: CPT | Mod: S$GLB,,, | Performed by: OTOLARYNGOLOGY

## 2020-02-19 PROCEDURE — 99213 PR OFFICE/OUTPT VISIT, EST, LEVL III, 20-29 MIN: ICD-10-PCS | Mod: 25,S$GLB,, | Performed by: OTOLARYNGOLOGY

## 2020-02-19 PROCEDURE — 3008F PR BODY MASS INDEX (BMI) DOCUMENTED: ICD-10-PCS | Mod: CPTII,S$GLB,, | Performed by: OTOLARYNGOLOGY

## 2020-02-19 PROCEDURE — 31575 PR LARYNGOSCOPY, FLEXIBLE; DIAGNOSTIC: ICD-10-PCS | Mod: S$GLB,,, | Performed by: OTOLARYNGOLOGY

## 2020-02-19 PROCEDURE — 31231 PR NASAL ENDOSCOPY, DX: ICD-10-PCS | Mod: 59,S$GLB,, | Performed by: OTOLARYNGOLOGY

## 2020-02-19 PROCEDURE — 99999 PR PBB SHADOW E&M-EST. PATIENT-LVL III: ICD-10-PCS | Mod: PBBFAC,,, | Performed by: OTOLARYNGOLOGY

## 2020-02-19 PROCEDURE — 99999 PR PBB SHADOW E&M-EST. PATIENT-LVL III: CPT | Mod: PBBFAC,,, | Performed by: OTOLARYNGOLOGY

## 2020-02-19 PROCEDURE — 3008F BODY MASS INDEX DOCD: CPT | Mod: CPTII,S$GLB,, | Performed by: OTOLARYNGOLOGY

## 2020-02-19 RX ORDER — ESCITALOPRAM OXALATE 10 MG/1
TABLET ORAL
COMMUNITY
Start: 2020-02-04 | End: 2021-03-23

## 2020-02-19 NOTE — PROGRESS NOTES
HEAD AND NECK SURGICAL ONCOLOGY CLINIC NOTE     CC: F/U FESS/Septoplasty     TREATMENT HISTORY:  1. FESS/Septo/SMRT 9/23/2019     INTERVAL HISTORY:Willie Roach returns to the [facility] for follow-up of FESS. No complaints today. Denies fevers, chills, and nightsweats. His PND has abated with [place] and Flonase, he is breathing well and denies PND or rhinorrhea. He is very troubled by dry paroxysmal cough, frequent throat clearing, occasional bringing up of mucus, and all of these are worse when supine. He has known GERD though is unsure if he is still taking his protonix. He has had these symptoms in the past which were worsened by Lexapro, which he would like to get off of.     Exam:  OC/OP normal, no masses  Septum with slight residual R-deviation, turbinates 2+  OMCs clear, no mucopus  Good nasal airway  No neck masses  Voice strong     NASAL ENDOSCOPY     Indications:  PND     Procedure:  With the patient sitting upright, informed consent was obtained.  Topical anesthesia and vasoconstriction was applied to the nares bilaterally.  After waiting an appropriate period of time for anesthesia/vasoconstriction to become effective, an endoscope was passed through both nares, and the nose and nasopharynx were examined.  The patient tolerated the procedure without complications.     Findings: The nasal passageways and nasopharynx appear normal without edema or erythema.  The septum appears L-deviated The inferior turbinates appear 2+. There are no intranasal masses or visible mucopus. Wide maxillary antrostomies present, small synichial band on R between anterior ethmoid bulla and middle turbinate. The Eustachian tubes are patent. The airway is widely patent.      FLEXIBLE LARYNGOSCOPY     Indications:  LPR, cough, globus     Procedure:  With the patient sitting upright, informed consent was obtained.  Topical anesthesia and vasoconstriction was applied to both nares.  After  waiting an appropriate period of time for anesthesia/vasoconstriction to become effective, a flexible laryngoscope was passed through the L side(s) of the nose and the nose, nasopharynx, oropharynx, hypopharynx and larynx were examined.  The patient tolerated the procedure without complications.     Findings: The nasal passageways and nasopharynx appear normal without edema or erythema.  The oropharynx, base of tongue, piriform sinuses, epiglottis, and aryepiglottic folds were examined and no masses, lesions, or ulcerations were seen but there is significant interarytenoid edema.  The true vocal cords move well to midline bilaterally.  The airway is widely patent.      A/P: He has had resolution of his sinus symptoms and his allergic rhinitis is well-controlled on Flonase. We reviewed application technique, emphasizing directing the spray to the lateral nasal wall rather than the septum. It seems likely his symptoms are LPR-related, and he is reluctant to increase his PPI dose to BID. We discussed diet and lifestyle modifications, and I encouraged him to discuss with his PCP whether a different SSRI could be Rx given his prior symptomatology with Lexapro.

## 2020-04-01 ENCOUNTER — HOSPITAL ENCOUNTER (OUTPATIENT)
Dept: RADIOLOGY | Facility: HOSPITAL | Age: 60
Discharge: HOME | End: 2020-04-01
Attending: FAMILY MEDICINE
Payer: COMMERCIAL

## 2020-04-01 DIAGNOSIS — R31.9 HEMATURIA SYNDROME: ICD-10-CM

## 2020-04-01 LAB
CREAT SERPL-MCNC: 0.8 MG/DL (ref 0.5–1.4)
SAMPLE: NORMAL

## 2020-04-01 PROCEDURE — 74178 CT ABD&PLV WO CNTR FLWD CNTR: CPT | Mod: 26,,, | Performed by: RADIOLOGY

## 2020-04-01 PROCEDURE — 25500020 PHARM REV CODE 255: Performed by: FAMILY MEDICINE

## 2020-04-01 PROCEDURE — 74178 CT ABD&PLV WO CNTR FLWD CNTR: CPT | Mod: TC

## 2020-04-01 PROCEDURE — 74178 CT ABDOMEN PELVIS W WO CONTRAST: ICD-10-PCS | Mod: 26,,, | Performed by: RADIOLOGY

## 2020-04-01 RX ADMIN — IOHEXOL 100 ML: 350 INJECTION, SOLUTION INTRAVENOUS at 08:04

## 2020-06-08 ENCOUNTER — OFFICE VISIT (OUTPATIENT)
Dept: UROLOGY | Facility: CLINIC | Age: 60
End: 2020-06-08
Payer: COMMERCIAL

## 2020-06-08 VITALS
DIASTOLIC BLOOD PRESSURE: 76 MMHG | BODY MASS INDEX: 34.08 KG/M2 | HEART RATE: 73 BPM | WEIGHT: 217.13 LBS | SYSTOLIC BLOOD PRESSURE: 123 MMHG | HEIGHT: 67 IN

## 2020-06-08 DIAGNOSIS — N50.82 SCROTAL PAIN: Primary | ICD-10-CM

## 2020-06-08 PROCEDURE — 99204 PR OFFICE/OUTPT VISIT, NEW, LEVL IV, 45-59 MIN: ICD-10-PCS | Mod: S$GLB,,, | Performed by: UROLOGY

## 2020-06-08 PROCEDURE — 99999 PR PBB SHADOW E&M-EST. PATIENT-LVL II: CPT | Mod: PBBFAC,,, | Performed by: UROLOGY

## 2020-06-08 PROCEDURE — 3008F PR BODY MASS INDEX (BMI) DOCUMENTED: ICD-10-PCS | Mod: CPTII,S$GLB,, | Performed by: UROLOGY

## 2020-06-08 PROCEDURE — 99204 OFFICE O/P NEW MOD 45 MIN: CPT | Mod: S$GLB,,, | Performed by: UROLOGY

## 2020-06-08 PROCEDURE — 99999 PR PBB SHADOW E&M-EST. PATIENT-LVL II: ICD-10-PCS | Mod: PBBFAC,,, | Performed by: UROLOGY

## 2020-06-08 PROCEDURE — 3008F BODY MASS INDEX DOCD: CPT | Mod: CPTII,S$GLB,, | Performed by: UROLOGY

## 2020-06-08 RX ORDER — OMEPRAZOLE 40 MG/1
40 CAPSULE, DELAYED RELEASE ORAL DAILY
COMMUNITY

## 2020-06-08 NOTE — LETTER
June 8, 2020      Lucretia Kinney Mai, MD  [address]  Lucretia [facility] [address]           [facility] - Urology 4th Floor  [address]  [place] [address]  Phone: [phone]          Patient: Willie Roach   MR Number: [id number]   YOB: 1960   Date of Visit: 6/8/2020       Dear Dr. Lucretia Kinney Mai:    Thank you for referring Willie Roach to me for evaluation. Attached you will find relevant portions of my assessment and plan of care.    If you have questions, please do not hesitate to call me. I look forward to following Willie Roach along with you.    Sincerely,    Cesar Cruz Jr., MD    Enclosure  CC:  No Recipients    If you would like to receive this communication electronically, please contact [email or web address] or [phone] to request more information on [facility] [employer] access.    For providers and/or their staff who would like to refer a patient to [facility], please contact us through our one-stop-shop provider referral line, [facility], at [phone].    If you feel you have received this communication in error or would no longer like to receive these types of communications, please e-mail [email or web address]

## 2020-06-08 NOTE — PROGRESS NOTES
Subjective:       Patient ID: Willie Roach is a 60 y.o. male.    Chief Complaint: Advice Only (testicular pain left/right pain is worse when standing. he had history of kidney stone a month ago and was rx flomax to take .)    HPI patient has 3 day history of testalgia.  No lower tract irritative symptoms.  No flank pain.  Urinalysis is negative.  No testicular swelling.  No fever chills nausea vomiting.  Patient had a CT scan last year demonstrating no stones.  He had a a scrotal ultrasound that he states was negative a month ago    Past Medical History:   Diagnosis Date    Anxiety     Hyperlipidemia     Hypertension        Past Surgical History:   Procedure Laterality Date    BACK SURGERY      BUNIONECTOMY Right 4/4/2019    Procedure: BUNIONECTOMY;  Surgeon: Tyler Perez DPM;  Location: [address];  Service: Podiatry;  Laterality: Right;    CARPAL TUNNEL RELEASE      ELBOW SURGERY      EYE SURGERY      FRONTAL SINUS OBLITERATION  9/23/2019    Procedure: SINUSOTOMY, FRONTAL SINUS, OBLITERATIVE;  Surgeon: Jeramy Cuadra MD;  Location: [address];  Service: ENT;;    FUNCTIONAL ENDOSCOPIC SINUS SURGERY (FESS) Bilateral 9/23/2019    Procedure: FESS (FUNCTIONAL ENDOSCOPIC SINUS SURGERY);  Surgeon: Jeramy Cuadra MD;  Location: [facility] [address] [place] [address];  Service: ENT;  Laterality: Bilateral;  FESS set, septoplasty set    KNEE ARTHROSCOPY      MAXILLARY ANTROSTOMY Bilateral 9/23/2019    Procedure: MAXILLARY ANTROSTOMY;  Surgeon: Jeramy Cuadra MD;  Location: [facility] [address] [place] [address];  Service: ENT;  Laterality: Bilateral;    NASAL SEPTOPLASTY N/A 9/23/2019    Procedure: SEPTOPLASTY, NOSE;  Surgeon: Jeramy Cuadra MD;  Location: [address];  Service: ENT;  Laterality: N/A;    NASAL TURBINATE REDUCTION Bilateral 9/23/2019    Procedure: REDUCTION, NASAL TURBINATE;  Surgeon: Jeramy Cuadra MD;  Location: [address];  Service: ENT;  Laterality: Bilateral;       Family History   Family history  unknown: Yes       Social History     Socioeconomic History    Marital status:      Spouse name: Not on file    Number of children: Not on file    Years of education: Not on file    Highest education level: Not on file   Occupational History    Not on file   Social Needs    Financial resource strain: Not on file    Food insecurity:     Worry: Not on file     Inability: Not on file    Transportation needs:     Medical: Not on file     Non-medical: Not on file   Tobacco Use    Smoking status: Never Smoker    Smokeless tobacco: Never Used   Substance and Sexual Activity    Alcohol use: Yes     Comment: socially    Drug use: Never    Sexual activity: Not on file   Lifestyle    Physical activity:     Days per week: Not on file     Minutes per session: Not on file    Stress: Not on file   Relationships    Social connections:     Talks on phone: Not on file     Gets together: Not on file     Attends Anabaptism service: Not on file     Active member of club or organization: Not on file     Attends meetings of clubs or organizations: Not on file     Relationship status: Not on file   Other Topics Concern    Not on file   Social History Narrative    Not on file       Allergies:  Codeine and Angi    Medications:    Current Outpatient Medications:     amlodipine-benazepril 5-10 mg (LOTREL) 5-10 mg per capsule, Take 1 capsule by mouth once daily., Disp: , Rfl: 0    B-complex with vitamin C (SUPER B COMPLEX-VITAMIN C ORAL), Take 1 tablet by mouth once daily., Disp: , Rfl:     co-enzyme Q-10 30 mg capsule, Take 30 mg by mouth 2 (two) times daily., Disp: , Rfl:     flaxseed oil 1,000 mg Cap, Take 1 capsule by mouth once daily., Disp: , Rfl:     omeprazole (PRILOSEC) 40 MG capsule, Take 40 mg by mouth once daily., Disp: , Rfl:     simvastatin (ZOCOR) 20 MG tablet, Take 20 mg by mouth every evening., Disp: , Rfl: 0    vit A/vit C/vit E/zinc/copper (PRESERVISION AREDS ORAL), Take by mouth once daily.,  Disp: , Rfl:     vit C/E/zinc ox/harpal/lut/zeax (ICAPS AREDS2 ORAL), Take by mouth., Disp: , Rfl:     vortioxetine (TRINTELLIX) 10 mg Tab, Take 10 mg by mouth., Disp: , Rfl:     brimonidine 0.2% (ALPHAGAN) 0.2 % Drop, INSTILL ONE DROP INTO EACH EYE TWICE A DAY, Disp: , Rfl: 0    cetirizine (ZYRTEC) 10 MG tablet, Take 10 mg by mouth once daily., Disp: , Rfl: 0    diazePAM (VALIUM) 10 MG Tab, Take 10 mg by mouth nightly., Disp: , Rfl: 1    escitalopram oxalate (LEXAPRO) 10 MG tablet, , Disp: , Rfl:     ondansetron (ZOFRAN-ODT) 4 MG TbDL, Dissolve 2 tablets (8 mg total) by mouth every 12 (twelve) hours as needed., Disp: 15 tablet, Rfl: 0    oxyCODONE-acetaminophen (PERCOCET) 5-325 mg per tablet, Take 1 tablet by mouth every 6 (six) hours as needed for Pain., Disp: 15 tablet, Rfl: 0    pantoprazole (PROTONIX) 40 MG tablet, Take 1 tablet (40 mg total) by mouth once daily., Disp: 30 tablet, Rfl: 11    sodium chloride (OCEAN) 0.65 % nasal spray, 2 sprays by Nasal route every 6 (six) hours while awake., Disp: 2 Bottle, Rfl: 2    Review of Systems   Constitutional: Negative for activity change, appetite change, chills, diaphoresis, fatigue, fever and unexpected weight change.   HENT: Negative for congestion, dental problem, hearing loss, mouth sores, postnasal drip, rhinorrhea, sinus pressure and trouble swallowing.    Eyes: Negative for pain, discharge and itching.   Respiratory: Negative for apnea, cough, choking, chest tightness, shortness of breath and wheezing.    Cardiovascular: Negative for chest pain, palpitations and leg swelling.   Gastrointestinal: Negative for abdominal distention, abdominal pain, anal bleeding, blood in stool, constipation, diarrhea, nausea, rectal pain and vomiting.   Endocrine: Negative for polydipsia and polyuria.   Genitourinary: Negative for decreased urine volume, difficulty urinating, discharge, dysuria, enuresis, flank pain, frequency, genital sores, hematuria, penile pain,  penile swelling, scrotal swelling, testicular pain and urgency.   Musculoskeletal: Negative for arthralgias, back pain and myalgias.   Skin: Negative for color change, rash and wound.   Neurological: Negative for dizziness, syncope, speech difficulty, light-headedness and headaches.   Hematological: Negative for adenopathy. Does not bruise/bleed easily.   Psychiatric/Behavioral: Negative for behavioral problems, confusion, hallucinations and sleep disturbance.       Objective:      Physical Exam   Constitutional: He appears well-developed.   HENT:   Head: Normocephalic.   Cardiovascular: Normal rate.    Pulmonary/Chest: Effort normal.   Abdominal: Soft.   Genitourinary: Prostate normal.   Genitourinary Comments: Penis and testicles unremarkable no evidence of masses epididymitis or hernias.  Prostate is 30 g and benign   Neurological: He is alert.   Skin: Skin is warm.     Psychiatric: He has a normal mood and affect.       Assessment:       1. Scrotal pain        Plan:       Willie was seen today for advice only.    Diagnoses and all orders for this visit:    Scrotal pain        sec find no definitive abnormalities.  I have recommended that he use NSAIDs and do Sitz baths for the time being since this is only been going on for 3 days.  If it gets worse and I would repeat the testicular ultrasound.  He will call me and let me now

## 2020-08-13 ENCOUNTER — OFFICE VISIT (OUTPATIENT)
Dept: DERMATOLOGY | Facility: CLINIC | Age: 60
End: 2020-08-13
Payer: COMMERCIAL

## 2020-08-13 DIAGNOSIS — L91.8 SKIN TAG: Primary | ICD-10-CM

## 2020-08-13 DIAGNOSIS — R20.9 SKIN SENSATION DISTURBANCE: ICD-10-CM

## 2020-08-13 PROCEDURE — 99999 PR PBB SHADOW E&M-EST. PATIENT-LVL III: CPT | Mod: PBBFAC,,, | Performed by: PHYSICIAN ASSISTANT

## 2020-08-13 PROCEDURE — 99499 NO LOS: ICD-10-PCS | Mod: S$GLB,,, | Performed by: PHYSICIAN ASSISTANT

## 2020-08-13 PROCEDURE — 11200 RMVL SKIN TAGS UP TO&INC 15: CPT | Mod: S$GLB,,, | Performed by: PHYSICIAN ASSISTANT

## 2020-08-13 PROCEDURE — 99499 UNLISTED E&M SERVICE: CPT | Mod: S$GLB,,, | Performed by: PHYSICIAN ASSISTANT

## 2020-08-13 PROCEDURE — 99999 PR PBB SHADOW E&M-EST. PATIENT-LVL III: ICD-10-PCS | Mod: PBBFAC,,, | Performed by: PHYSICIAN ASSISTANT

## 2020-08-13 PROCEDURE — 11200 PR REMOVAL OF SKIN TAGS, UP TO 15: ICD-10-PCS | Mod: S$GLB,,, | Performed by: PHYSICIAN ASSISTANT

## 2020-08-13 NOTE — PATIENT INSTRUCTIONS

## 2020-08-13 NOTE — PROGRESS NOTES
Subjective:       Patient ID:  Willie Roach is a 60 y.o. male who presents for   Chief Complaint   Patient presents with    Skin Tags     right axilla, right inner thigh     Skin Tags - Initial  Affected locations: right axilla and groin  Duration: few yrs (groin), axilla more recent.  Signs / symptoms: irritated, redness and tender  Aggravated by: friction  Treatments tried: one in groin was removed yrs ago then recurred.        Review of Systems   Constitutional: Negative for fever and chills.   Skin: Negative for sensitivity to antibiotic ointment, sensitivity to bandage adhesive and tendency to form keloidal scars.   Hematologic/Lymphatic: Does not bruise/bleed easily.        Objective:    Physical Exam   Constitutional: He appears well-developed and well-nourished. No distress.   Neurological: He is alert and oriented to person, place, and time. He is not disoriented.   Psychiatric: He has a normal mood and affect.   Skin:   Areas Examined (abnormalities noted in diagram):   Chest / Axilla Inspection Performed  Genitals / Buttocks / Groin Inspection Performed                  Diagram Legend     Erythematous scaling macule/papule c/w actinic keratosis       Vascular papule c/w angioma      Pigmented verrucoid papule/plaque c/w seborrheic keratosis      Yellow umbilicated papule c/w sebaceous hyperplasia      Irregularly shaped tan macule c/w lentigo     1-2 mm smooth white papules consistent with Milia      Movable subcutaneous cyst with punctum c/w epidermal inclusion cyst      Subcutaneous movable cyst c/w pilar cyst      Firm pink to brown papule c/w dermatofibroma      Pedunculated fleshy papule(s) c/w skin tag(s)      Evenly pigmented macule c/w junctional nevus     Mildly variegated pigmented, slightly irregular-bordered macule c/w mildly atypical nevus      Flesh colored to evenly pigmented papule c/w intradermal nevus       Pink pearly papule/plaque c/w basal cell carcinoma      Erythematous  hyperkeratotic cursted plaque c/w SCC      Surgical scar with no sign of skin cancer recurrence      Open and closed comedones      Inflammatory papules and pustules      Verrucoid papule consistent consistent with wart     Erythematous eczematous patches and plaques     Dystrophic onycholytic nail with subungual debris c/w onychomycosis     Umbilicated papule    Erythematous-base heme-crusted tan verrucoid plaque consistent with inflamed seborrheic keratosis     Erythematous Silvery Scaling Plaque c/w Psoriasis     See annotation      Assessment / Plan:      Skin tags with Skin sensation disturbance  Verbal consent obtained. 3 lesions removed with scissor snip removal after anesthesia with 1% lidocaine with epinephrine. Hemostasis achieved with aluminum chloride and hyfrecation. No complications.         Follow up if symptoms worsen or fail to improve.

## 2020-09-30 ENCOUNTER — OCCUPATIONAL HEALTH (OUTPATIENT)
Dept: URGENT CARE | Facility: CLINIC | Age: 60
End: 2020-09-30

## 2020-09-30 DIAGNOSIS — Z11.9 ENCOUNTER FOR SCREENING EXAMINATION FOR INFECTIOUS DISEASE: Primary | ICD-10-CM

## 2020-09-30 LAB
CTP QC/QA: YES
SARS-COV-2 RDRP RESP QL NAA+PROBE: NEGATIVE

## 2020-09-30 PROCEDURE — U0002 COVID-19 LAB TEST NON-CDC: HCPCS | Mod: QW,S$GLB,, | Performed by: PHYSICIAN ASSISTANT

## 2020-09-30 PROCEDURE — U0002: ICD-10-PCS | Mod: QW,S$GLB,, | Performed by: PHYSICIAN ASSISTANT

## 2020-11-12 ENCOUNTER — OCCUPATIONAL HEALTH (OUTPATIENT)
Dept: URGENT CARE | Facility: CLINIC | Age: 60
End: 2020-11-12

## 2020-11-12 DIAGNOSIS — Z11.9 ENCOUNTER FOR SCREENING EXAMINATION FOR INFECTIOUS DISEASE: Primary | ICD-10-CM

## 2020-11-12 LAB
CTP QC/QA: YES
SARS-COV-2 RDRP RESP QL NAA+PROBE: NEGATIVE

## 2020-11-12 PROCEDURE — U0002: ICD-10-PCS | Mod: QW,S$GLB,, | Performed by: NURSE PRACTITIONER

## 2020-11-12 PROCEDURE — U0002 COVID-19 LAB TEST NON-CDC: HCPCS | Mod: QW,S$GLB,, | Performed by: NURSE PRACTITIONER

## 2020-12-22 ENCOUNTER — TELEPHONE (OUTPATIENT)
Dept: OPHTHALMOLOGY | Facility: CLINIC | Age: 60
End: 2020-12-22

## 2020-12-22 NOTE — TELEPHONE ENCOUNTER
Spoke to pt to schedule cxl eval  Dr rico ref   Will send in ref to katie's fax   Contact office at [phone]          ----- Message from Shelly Ray sent at 12/22/2020  3:32 PM CST -----  Contact: Willie # [phone]  [facility] calling on behalf of the pt regarding  a referral that was sent. Please call pt for appt.

## 2020-12-28 ENCOUNTER — OCCUPATIONAL HEALTH (OUTPATIENT)
Dept: URGENT CARE | Facility: CLINIC | Age: 60
End: 2020-12-28

## 2020-12-28 DIAGNOSIS — Z78.9 NO KNOWN HEALTH PROBLEMS: Primary | ICD-10-CM

## 2020-12-28 LAB
CTP QC/QA: YES
SARS-COV-2 RDRP RESP QL NAA+PROBE: NEGATIVE

## 2020-12-28 PROCEDURE — U0002 COVID-19 LAB TEST NON-CDC: HCPCS | Mod: QW,S$GLB,, | Performed by: PHYSICIAN ASSISTANT

## 2020-12-28 PROCEDURE — U0002: ICD-10-PCS | Mod: QW,S$GLB,, | Performed by: PHYSICIAN ASSISTANT

## 2021-01-21 ENCOUNTER — TELEPHONE (OUTPATIENT)
Dept: SLEEP MEDICINE | Facility: CLINIC | Age: 61
End: 2021-01-21

## 2021-02-25 DIAGNOSIS — R07.9 CHEST PAIN, UNSPECIFIED: ICD-10-CM

## 2021-02-26 DIAGNOSIS — R07.9 CHEST PAIN, UNSPECIFIED TYPE: Primary | ICD-10-CM

## 2021-03-01 ENCOUNTER — TELEPHONE (OUTPATIENT)
Dept: CARDIOLOGY | Facility: CLINIC | Age: 61
End: 2021-03-01

## 2021-03-03 ENCOUNTER — HOSPITAL ENCOUNTER (OUTPATIENT)
Dept: CARDIOLOGY | Facility: HOSPITAL | Age: 61
Discharge: HOME | End: 2021-03-03
Attending: FAMILY MEDICINE
Payer: COMMERCIAL

## 2021-03-03 VITALS — BODY MASS INDEX: 34.06 KG/M2 | WEIGHT: 217 LBS | HEIGHT: 67 IN

## 2021-03-03 DIAGNOSIS — R07.9 CHEST PAIN, UNSPECIFIED TYPE: ICD-10-CM

## 2021-03-03 LAB
CV PHARM DOSE: 0.4 MG
CV STRESS BASE HR: 65 BPM
DIASTOLIC BLOOD PRESSURE: 83 MMHG
END DIASTOLIC INDEX-HIGH: 170 ML/M2
END SYSTOLIC INDEX-HIGH: 70 ML/M2
NUC REST DIASTOLIC VOLUME INDEX: 107
NUC REST EJECTION FRACTION: 67
NUC REST SYSTOLIC VOLUME INDEX: 36
NUC STRESS DIASTOLIC VOLUME INDEX: 127
NUC STRESS EJECTION FRACTION: 75 %
NUC STRESS SYSTOLIC VOLUME INDEX: 32
OHS CV CPX 85 PERCENT MAX PREDICTED HEART RATE MALE: 135
OHS CV CPX MAX PREDICTED HEART RATE: 159
OHS CV CPX PATIENT IS FEMALE: 0
OHS CV CPX PATIENT IS MALE: 1
OHS CV CPX PEAK DIASTOLIC BLOOD PRESSURE: 89 MMHG
OHS CV CPX PEAK HEAR RATE: 80 BPM
OHS CV CPX PEAK RATE PRESSURE PRODUCT: NORMAL
OHS CV CPX PEAK SYSTOLIC BLOOD PRESSURE: 142 MMHG
OHS CV CPX PERCENT MAX PREDICTED HEART RATE ACHIEVED: 50
OHS CV CPX RATE PRESSURE PRODUCT PRESENTING: 8710
RETIRED EF AND QEF - SEE NOTES: 51 %
SYSTOLIC BLOOD PRESSURE: 134 MMHG

## 2021-03-03 PROCEDURE — 93018 STRESS TEST WITH MYOCARDIAL PERFUSION (CUPID ONLY): ICD-10-PCS | Mod: ,,, | Performed by: INTERNAL MEDICINE

## 2021-03-03 PROCEDURE — 78452 HT MUSCLE IMAGE SPECT MULT: CPT | Mod: 26,,, | Performed by: INTERNAL MEDICINE

## 2021-03-03 PROCEDURE — 78452 STRESS TEST WITH MYOCARDIAL PERFUSION (CUPID ONLY): ICD-10-PCS | Mod: 26,,, | Performed by: INTERNAL MEDICINE

## 2021-03-03 PROCEDURE — 93018 CV STRESS TEST I&R ONLY: CPT | Mod: ,,, | Performed by: INTERNAL MEDICINE

## 2021-03-03 PROCEDURE — 93016 STRESS TEST WITH MYOCARDIAL PERFUSION (CUPID ONLY): ICD-10-PCS | Mod: ,,, | Performed by: INTERNAL MEDICINE

## 2021-03-03 PROCEDURE — 93016 CV STRESS TEST SUPVJ ONLY: CPT | Mod: ,,, | Performed by: INTERNAL MEDICINE

## 2021-03-03 PROCEDURE — 63600175 PHARM REV CODE 636 W HCPCS: Performed by: FAMILY MEDICINE

## 2021-03-03 PROCEDURE — A9502 TC99M TETROFOSMIN: HCPCS

## 2021-03-03 PROCEDURE — 78452 HT MUSCLE IMAGE SPECT MULT: CPT

## 2021-03-03 RX ORDER — REGADENOSON 0.08 MG/ML
0.4 INJECTION, SOLUTION INTRAVENOUS
Status: COMPLETED | OUTPATIENT
Start: 2021-03-03 | End: 2021-03-03

## 2021-03-03 RX ADMIN — REGADENOSON 0.4 MG: 0.08 INJECTION, SOLUTION INTRAVENOUS at 08:03

## 2021-03-23 ENCOUNTER — OFFICE VISIT (OUTPATIENT)
Dept: SLEEP MEDICINE | Facility: CLINIC | Age: 61
End: 2021-03-23
Payer: COMMERCIAL

## 2021-03-23 VITALS
DIASTOLIC BLOOD PRESSURE: 82 MMHG | HEART RATE: 63 BPM | BODY MASS INDEX: 34.53 KG/M2 | HEIGHT: 67 IN | WEIGHT: 220 LBS | SYSTOLIC BLOOD PRESSURE: 138 MMHG

## 2021-03-23 DIAGNOSIS — R29.818 SUSPECTED SLEEP APNEA: Primary | ICD-10-CM

## 2021-03-23 DIAGNOSIS — G47.10 HYPERSOMNOLENCE: ICD-10-CM

## 2021-03-23 PROCEDURE — 3008F PR BODY MASS INDEX (BMI) DOCUMENTED: ICD-10-PCS | Mod: CPTII,S$GLB,, | Performed by: INTERNAL MEDICINE

## 2021-03-23 PROCEDURE — 99999 PR PBB SHADOW E&M-EST. PATIENT-LVL III: ICD-10-PCS | Mod: PBBFAC,,, | Performed by: INTERNAL MEDICINE

## 2021-03-23 PROCEDURE — 99204 PR OFFICE/OUTPT VISIT, NEW, LEVL IV, 45-59 MIN: ICD-10-PCS | Mod: S$GLB,,, | Performed by: INTERNAL MEDICINE

## 2021-03-23 PROCEDURE — 99999 PR PBB SHADOW E&M-EST. PATIENT-LVL III: CPT | Mod: PBBFAC,,, | Performed by: INTERNAL MEDICINE

## 2021-03-23 PROCEDURE — 99204 OFFICE O/P NEW MOD 45 MIN: CPT | Mod: S$GLB,,, | Performed by: INTERNAL MEDICINE

## 2021-03-23 PROCEDURE — 1126F PR PAIN SEVERITY QUANTIFIED, NO PAIN PRESENT: ICD-10-PCS | Mod: S$GLB,,, | Performed by: INTERNAL MEDICINE

## 2021-03-23 PROCEDURE — 1126F AMNT PAIN NOTED NONE PRSNT: CPT | Mod: S$GLB,,, | Performed by: INTERNAL MEDICINE

## 2021-03-23 PROCEDURE — 3008F BODY MASS INDEX DOCD: CPT | Mod: CPTII,S$GLB,, | Performed by: INTERNAL MEDICINE

## 2021-03-23 RX ORDER — METFORMIN HYDROCHLORIDE 500 MG/1
500 TABLET ORAL DAILY
COMMUNITY
Start: 2021-03-01

## 2021-03-24 ENCOUNTER — OFFICE VISIT (OUTPATIENT)
Dept: CARDIOLOGY | Facility: CLINIC | Age: 61
End: 2021-03-24
Payer: COMMERCIAL

## 2021-03-24 VITALS
DIASTOLIC BLOOD PRESSURE: 81 MMHG | HEART RATE: 65 BPM | BODY MASS INDEX: 36.06 KG/M2 | SYSTOLIC BLOOD PRESSURE: 140 MMHG | HEIGHT: 67 IN | WEIGHT: 229.75 LBS

## 2021-03-24 DIAGNOSIS — E78.5 DYSLIPIDEMIA: ICD-10-CM

## 2021-03-24 DIAGNOSIS — R07.9 CHEST PAIN, UNSPECIFIED TYPE: Primary | ICD-10-CM

## 2021-03-24 DIAGNOSIS — E66.01 SEVERE OBESITY (BMI 35.0-39.9) WITH COMORBIDITY: ICD-10-CM

## 2021-03-24 DIAGNOSIS — R93.1 ABNORMAL NUCLEAR CARDIAC IMAGING TEST: ICD-10-CM

## 2021-03-24 DIAGNOSIS — G47.33 OBSTRUCTIVE SLEEP APNEA: ICD-10-CM

## 2021-03-24 DIAGNOSIS — E11.9 DIABETES MELLITUS WITHOUT COMPLICATION: ICD-10-CM

## 2021-03-24 PROCEDURE — 99999 PR PBB SHADOW E&M-EST. PATIENT-LVL III: CPT | Mod: PBBFAC,,, | Performed by: INTERNAL MEDICINE

## 2021-03-24 PROCEDURE — 3008F BODY MASS INDEX DOCD: CPT | Mod: CPTII,S$GLB,, | Performed by: INTERNAL MEDICINE

## 2021-03-24 PROCEDURE — 1126F PR PAIN SEVERITY QUANTIFIED, NO PAIN PRESENT: ICD-10-PCS | Mod: S$GLB,,, | Performed by: INTERNAL MEDICINE

## 2021-03-24 PROCEDURE — 99999 PR PBB SHADOW E&M-EST. PATIENT-LVL III: ICD-10-PCS | Mod: PBBFAC,,, | Performed by: INTERNAL MEDICINE

## 2021-03-24 PROCEDURE — 1126F AMNT PAIN NOTED NONE PRSNT: CPT | Mod: S$GLB,,, | Performed by: INTERNAL MEDICINE

## 2021-03-24 PROCEDURE — 3008F PR BODY MASS INDEX (BMI) DOCUMENTED: ICD-10-PCS | Mod: CPTII,S$GLB,, | Performed by: INTERNAL MEDICINE

## 2021-03-24 PROCEDURE — 99214 OFFICE O/P EST MOD 30 MIN: CPT | Mod: S$GLB,,, | Performed by: INTERNAL MEDICINE

## 2021-03-24 PROCEDURE — 99214 PR OFFICE/OUTPT VISIT, EST, LEVL IV, 30-39 MIN: ICD-10-PCS | Mod: S$GLB,,, | Performed by: INTERNAL MEDICINE

## 2021-03-24 RX ORDER — ALBUTEROL SULFATE 90 UG/1
2 AEROSOL, METERED RESPIRATORY (INHALATION)
COMMUNITY
Start: 2021-03-02

## 2021-03-29 ENCOUNTER — PATIENT MESSAGE (OUTPATIENT)
Dept: SLEEP MEDICINE | Facility: CLINIC | Age: 61
End: 2021-03-29

## 2021-04-01 ENCOUNTER — TELEPHONE (OUTPATIENT)
Dept: CARDIOLOGY | Facility: CLINIC | Age: 61
End: 2021-04-01

## 2021-04-05 ENCOUNTER — HOSPITAL ENCOUNTER (OUTPATIENT)
Dept: CARDIOLOGY | Facility: HOSPITAL | Age: 61
Discharge: HOME | End: 2021-04-05
Attending: INTERNAL MEDICINE
Payer: COMMERCIAL

## 2021-04-05 VITALS — HEART RATE: 86 BPM | SYSTOLIC BLOOD PRESSURE: 104 MMHG | DIASTOLIC BLOOD PRESSURE: 61 MMHG

## 2021-04-05 DIAGNOSIS — E78.5 DYSLIPIDEMIA: ICD-10-CM

## 2021-04-05 DIAGNOSIS — E11.9 DIABETES MELLITUS WITHOUT COMPLICATION: ICD-10-CM

## 2021-04-05 DIAGNOSIS — E66.01 SEVERE OBESITY (BMI 35.0-39.9) WITH COMORBIDITY: ICD-10-CM

## 2021-04-05 DIAGNOSIS — R07.9 CHEST PAIN, UNSPECIFIED TYPE: ICD-10-CM

## 2021-04-05 DIAGNOSIS — R93.1 ABNORMAL NUCLEAR CARDIAC IMAGING TEST: ICD-10-CM

## 2021-04-05 LAB
CFR FLOW - ANTERIOR: 2.54
CFR FLOW - INFERIOR: 2
CFR FLOW - LATERAL: 2
CFR FLOW - MAX: 3.26
CFR FLOW - MIN: 1.35
CFR FLOW - SEPTAL: 2.13
CFR FLOW - WHOLE HEART: 2.17
CV PHARM DOSE: 58.3 MG
CV STRESS BASE HR: 71 BPM
DIASTOLIC BLOOD PRESSURE: 69 MMHG
END DIASTOLIC INDEX-HIGH: 170 ML/M2
END SYSTOLIC INDEX-HIGH: 70 ML/M2
NUC REST DIASTOLIC VOLUME INDEX: 77
NUC REST EJECTION FRACTION: 56
NUC REST SYSTOLIC VOLUME INDEX: 34
NUC STRESS DIASTOLIC VOLUME INDEX: 122
NUC STRESS EJECTION FRACTION: 76 %
NUC STRESS SYSTOLIC VOLUME INDEX: 30
OHS CV CPX 85 PERCENT MAX PREDICTED HEART RATE MALE: 135
OHS CV CPX MAX PREDICTED HEART RATE: 159
OHS CV CPX PATIENT IS FEMALE: 0
OHS CV CPX PATIENT IS MALE: 1
OHS CV CPX PEAK DIASTOLIC BLOOD PRESSURE: 62 MMHG
OHS CV CPX PEAK HEAR RATE: 70 BPM
OHS CV CPX PEAK RATE PRESSURE PRODUCT: 8190
OHS CV CPX PEAK SYSTOLIC BLOOD PRESSURE: 117 MMHG
OHS CV CPX PERCENT MAX PREDICTED HEART RATE ACHIEVED: 44
OHS CV CPX RATE PRESSURE PRODUCT PRESENTING: 7526
REST FLOW - ANTERIOR: 0.65 CC/MIN/G
REST FLOW - INFERIOR: 0.66 CC/MIN/G
REST FLOW - LATERAL: 0.71 CC/MIN/G
REST FLOW - MAX: 0.89 CC/MIN/G
REST FLOW - MIN: 0.35 CC/MIN/G
REST FLOW - SEPTAL: 0.52 CC/MIN/G
REST FLOW - WHOLE HEART: 0.64 CC/MIN/G
RETIRED EF AND QEF - SEE NOTES: 51 %
STRESS FLOW - ANTERIOR: 1.63 CC/MIN/G
STRESS FLOW - INFERIOR: 1.33 CC/MIN/G
STRESS FLOW - LATERAL: 1.43 CC/MIN/G
STRESS FLOW - MAX: 2.22 CC/MIN/G
STRESS FLOW - MIN: 0.8 CC/MIN/G
STRESS FLOW - SEPTAL: 1.09 CC/MIN/G
STRESS FLOW - WHOLE HEART: 1.37 CC/MIN/G
SYSTOLIC BLOOD PRESSURE: 106 MMHG

## 2021-04-05 PROCEDURE — 93016 CARDIAC PET SCAN STRESS (CUPID ONLY): ICD-10-PCS | Mod: ,,, | Performed by: INTERNAL MEDICINE

## 2021-04-05 PROCEDURE — 63600175 PHARM REV CODE 636 W HCPCS: Performed by: INTERNAL MEDICINE

## 2021-04-05 PROCEDURE — 78492 MYOCRD IMG PET MLT RST&STRS: CPT | Mod: 26,,, | Performed by: INTERNAL MEDICINE

## 2021-04-05 PROCEDURE — 78434 AQMBF PET REST & RX STRESS: CPT

## 2021-04-05 PROCEDURE — 78434 CARDIAC PET SCAN STRESS (CUPID ONLY): ICD-10-PCS | Mod: 26,,, | Performed by: INTERNAL MEDICINE

## 2021-04-05 PROCEDURE — 93018 CV STRESS TEST I&R ONLY: CPT | Mod: ,,, | Performed by: INTERNAL MEDICINE

## 2021-04-05 PROCEDURE — 78434 AQMBF PET REST & RX STRESS: CPT | Mod: 26,,, | Performed by: INTERNAL MEDICINE

## 2021-04-05 PROCEDURE — 93018 CARDIAC PET SCAN STRESS (CUPID ONLY): ICD-10-PCS | Mod: ,,, | Performed by: INTERNAL MEDICINE

## 2021-04-05 PROCEDURE — 78492 CARDIAC PET SCAN STRESS (CUPID ONLY): ICD-10-PCS | Mod: 26,,, | Performed by: INTERNAL MEDICINE

## 2021-04-05 PROCEDURE — 93016 CV STRESS TEST SUPVJ ONLY: CPT | Mod: ,,, | Performed by: INTERNAL MEDICINE

## 2021-04-05 RX ORDER — DIPYRIDAMOLE 5 MG/ML
58.29 INJECTION INTRAVENOUS ONCE
Status: COMPLETED | OUTPATIENT
Start: 2021-04-05 | End: 2021-04-05

## 2021-04-05 RX ADMIN — DIPYRIDAMOLE 58.3 MG: 5 INJECTION INTRAVENOUS at 08:04

## 2021-04-07 ENCOUNTER — TELEPHONE (OUTPATIENT)
Dept: SLEEP MEDICINE | Facility: CLINIC | Age: 61
End: 2021-04-07

## 2021-04-07 DIAGNOSIS — R29.818 SUSPECTED SLEEP APNEA: Primary | ICD-10-CM

## 2021-04-07 DIAGNOSIS — G47.10 HYPERSOMNOLENCE: ICD-10-CM

## 2021-04-12 ENCOUNTER — TELEPHONE (OUTPATIENT)
Dept: SLEEP MEDICINE | Facility: OTHER | Age: 61
End: 2021-04-12

## 2021-05-03 ENCOUNTER — TELEPHONE (OUTPATIENT)
Dept: SLEEP MEDICINE | Facility: OTHER | Age: 61
End: 2021-05-03

## 2021-05-04 ENCOUNTER — HOSPITAL ENCOUNTER (OUTPATIENT)
Dept: SLEEP MEDICINE | Facility: OTHER | Age: 61
Discharge: HOME | End: 2021-05-04
Attending: INTERNAL MEDICINE
Payer: COMMERCIAL

## 2021-05-04 ENCOUNTER — PATIENT MESSAGE (OUTPATIENT)
Dept: RESEARCH | Facility: HOSPITAL | Age: 61
End: 2021-05-04

## 2021-05-04 DIAGNOSIS — G47.33 OSA (OBSTRUCTIVE SLEEP APNEA): Primary | ICD-10-CM

## 2021-05-04 DIAGNOSIS — R29.818 SUSPECTED SLEEP APNEA: ICD-10-CM

## 2021-05-04 DIAGNOSIS — G47.10 HYPERSOMNOLENCE: ICD-10-CM

## 2021-05-04 PROCEDURE — 95800 PR SLEEP STUDY, UNATTENDED, RECORD HEART RATE/O2 SAT/RESP ANAL/SLEEP TIME: ICD-10-PCS | Mod: 26,52,, | Performed by: INTERNAL MEDICINE

## 2021-05-04 PROCEDURE — 95800 SLP STDY UNATTENDED: CPT | Mod: 26,52,, | Performed by: INTERNAL MEDICINE

## 2021-05-04 PROCEDURE — 95800 SLP STDY UNATTENDED: CPT

## 2021-05-13 ENCOUNTER — PATIENT MESSAGE (OUTPATIENT)
Dept: SLEEP MEDICINE | Facility: CLINIC | Age: 61
End: 2021-05-13

## 2021-05-13 DIAGNOSIS — G47.33 OSA (OBSTRUCTIVE SLEEP APNEA): Primary | ICD-10-CM

## 2021-05-25 ENCOUNTER — TELEPHONE (OUTPATIENT)
Dept: SLEEP MEDICINE | Facility: CLINIC | Age: 61
End: 2021-05-25

## 2021-06-17 DIAGNOSIS — G47.33 OBSTRUCTIVE SLEEP APNEA: Primary | ICD-10-CM

## 2021-07-02 DIAGNOSIS — R91.1 PULMONARY NODULE: Primary | ICD-10-CM

## 2021-08-03 ENCOUNTER — TELEPHONE (OUTPATIENT)
Dept: PSYCHIATRY | Facility: CLINIC | Age: 61
End: 2021-08-03

## 2021-08-03 ENCOUNTER — PATIENT MESSAGE (OUTPATIENT)
Dept: PSYCHIATRY | Facility: CLINIC | Age: 61
End: 2021-08-03

## 2021-08-05 ENCOUNTER — PATIENT MESSAGE (OUTPATIENT)
Dept: PSYCHIATRY | Facility: CLINIC | Age: 61
End: 2021-08-05

## 2021-08-16 ENCOUNTER — PATIENT MESSAGE (OUTPATIENT)
Dept: PSYCHIATRY | Facility: CLINIC | Age: 61
End: 2021-08-16

## 2021-08-23 ENCOUNTER — PATIENT MESSAGE (OUTPATIENT)
Dept: PSYCHIATRY | Facility: CLINIC | Age: 61
End: 2021-08-23

## 2021-09-14 ENCOUNTER — TELEPHONE (OUTPATIENT)
Dept: PSYCHIATRY | Facility: CLINIC | Age: 61
End: 2021-09-14

## 2021-10-13 ENCOUNTER — HOSPITAL ENCOUNTER (OUTPATIENT)
Dept: PULMONOLOGY | Facility: HOSPITAL | Age: 61
Discharge: HOME | End: 2021-10-13
Attending: PSYCHOLOGIST
Payer: COMMERCIAL

## 2021-10-13 DIAGNOSIS — R09.02 HYPOXEMIA: ICD-10-CM

## 2021-10-13 DIAGNOSIS — G47.33 OSA (OBSTRUCTIVE SLEEP APNEA): Primary | ICD-10-CM

## 2021-10-15 LAB
BRPFT: ABNORMAL
DLCO ADJ PRE: 12.78 ML/(MIN*MMHG) (ref 19.24–33.1)
DLCO SINGLE BREATH LLN: 19.24
DLCO SINGLE BREATH PRE REF: 48.8 %
DLCO SINGLE BREATH REF: 26.17
DLCOC SBVA LLN: 2.74
DLCOC SBVA PRE REF: 83.5 %
DLCOC SBVA REF: 4.02
DLCOC SINGLE BREATH LLN: 19.24
DLCOC SINGLE BREATH PRE REF: 48.8 %
DLCOC SINGLE BREATH REF: 26.17
DLCOVA LLN: 2.74
DLCOVA PRE REF: 83.5 %
DLCOVA PRE: 3.36 ML/(MIN*MMHG*L) (ref 2.74–5.31)
DLCOVA REF: 4.02
DLVAADJ PRE: 3.36 ML/(MIN*MMHG*L) (ref 2.74–5.31)
ERVN2 LLN: -16448.92
ERVN2 PRE REF: 38.8 %
ERVN2 PRE: 0.42 L (ref -16448.92–16451.08)
ERVN2 REF: 1.08
FEF 25 75 LLN: 1.26
FEF 25 75 PRE REF: 88.7 %
FEF 25 75 REF: 2.64
FEV1 FVC LLN: 65
FEV1 FVC PRE REF: 104.1 %
FEV1 FVC REF: 78
FEV1 LLN: 2.36
FEV1 PRE REF: 73.2 %
FEV1 REF: 3.16
FRCN2 LLN: 2.46
FRCN2 PRE REF: 42.3 %
FRCN2 REF: 3.45
FVC LLN: 3.09
FVC PRE REF: 70.2 %
FVC REF: 4.08
IVC PRE: 2.72 L (ref 3.09–5.07)
IVC SINGLE BREATH LLN: 3.09
IVC SINGLE BREATH PRE REF: 66.7 %
IVC SINGLE BREATH REF: 4.08
PEF LLN: 6.28
PEF PRE REF: 79.2 %
PEF REF: 8.39
PRE DLCO: 12.78 ML/(MIN*MMHG) (ref 19.24–33.1)
PRE FEF 25 75: 2.34 L/S (ref 1.26–4.01)
PRE FET 100: 7.35 SEC
PRE FEV1 FVC: 80.87 % (ref 65.23–90.07)
PRE FEV1: 2.32 L (ref 2.36–3.96)
PRE FRC N2: 1.46 L
PRE FVC: 2.87 L (ref 3.09–5.07)
PRE PEF: 6.65 L/S (ref 6.28–10.51)
RVN2 LLN: 1.69
RVN2 PRE REF: 33.5 %
RVN2 PRE: 0.79 L (ref 1.69–3.04)
RVN2 REF: 2.36
RVN2TLCN2 LLN: 29.16
RVN2TLCN2 PRE REF: 55.5 %
RVN2TLCN2 PRE: 21.17 % (ref 29.16–47.12)
RVN2TLCN2 REF: 38.14
TLCN2 LLN: 5.35
TLCN2 PRE REF: 57.4 %
TLCN2 PRE: 3.73 L (ref 5.35–7.65)
TLCN2 REF: 6.5
VA PRE: 3.8 L (ref 6.35–6.35)
VA SINGLE BREATH LLN: 6.35
VA SINGLE BREATH PRE REF: 59.9 %
VA SINGLE BREATH REF: 6.35
VCMAXN2 LLN: 3.09
VCMAXN2 PRE REF: 72.1 %
VCMAXN2 PRE: 2.94 L (ref 3.09–5.07)
VCMAXN2 REF: 4.08

## 2021-11-16 ENCOUNTER — HOSPITAL ENCOUNTER (OUTPATIENT)
Dept: PULMONOLOGY | Facility: HOSPITAL | Age: 61
Discharge: HOME | End: 2021-11-16
Attending: NURSE PRACTITIONER
Payer: COMMERCIAL

## 2021-11-16 DIAGNOSIS — R09.02 HYPOXEMIA: ICD-10-CM

## 2021-11-16 PROCEDURE — 94618 PULMONARY STRESS TESTING: CPT

## 2021-12-23 ENCOUNTER — HOSPITAL ENCOUNTER (OUTPATIENT)
Dept: PULMONOLOGY | Facility: HOSPITAL | Age: 61
Discharge: HOME | End: 2021-12-23
Attending: INTERNAL MEDICINE
Payer: COMMERCIAL

## 2021-12-23 DIAGNOSIS — G47.33 OBSTRUCTIVE SLEEP APNEA SYNDROME: ICD-10-CM

## 2021-12-23 DIAGNOSIS — R09.02 HYPOXEMIA: ICD-10-CM

## 2021-12-23 DIAGNOSIS — E66.9 CLASS 1 OBESITY WITH BODY MASS INDEX (BMI) OF 34.0 TO 34.9 IN ADULT: ICD-10-CM

## 2021-12-23 PROCEDURE — 94729 PR C02/MEMBANE DIFFUSE CAPACITY: ICD-10-PCS | Mod: 26,,, | Performed by: INTERNAL MEDICINE

## 2021-12-23 PROCEDURE — 94010 BREATHING CAPACITY TEST: CPT | Mod: 26,,, | Performed by: INTERNAL MEDICINE

## 2021-12-23 PROCEDURE — 94727 PR PULM FUNCTION TEST BY GAS: ICD-10-PCS | Mod: 26,,, | Performed by: INTERNAL MEDICINE

## 2021-12-23 PROCEDURE — 94010 BREATHING CAPACITY TEST: ICD-10-PCS | Mod: 26,,, | Performed by: INTERNAL MEDICINE

## 2021-12-23 PROCEDURE — 94729 DIFFUSING CAPACITY: CPT

## 2021-12-23 PROCEDURE — 94727 GAS DIL/WSHOT DETER LNG VOL: CPT

## 2021-12-23 PROCEDURE — 94727 GAS DIL/WSHOT DETER LNG VOL: CPT | Mod: 26,,, | Performed by: INTERNAL MEDICINE

## 2021-12-23 PROCEDURE — 94010 BREATHING CAPACITY TEST: CPT

## 2021-12-23 PROCEDURE — 94729 DIFFUSING CAPACITY: CPT | Mod: 26,,, | Performed by: INTERNAL MEDICINE

## 2022-01-25 ENCOUNTER — OCCUPATIONAL HEALTH (OUTPATIENT)
Dept: URGENT CARE | Facility: CLINIC | Age: 62
End: 2022-01-25

## 2022-01-25 DIAGNOSIS — Z76.89 RETURN TO WORK EVALUATION: Primary | ICD-10-CM

## 2022-02-15 LAB
BRPFT: ABNORMAL
DLCO ADJ PRE: 16.74 ML/(MIN*MMHG) (ref 19.24–33.1)
DLCO SINGLE BREATH LLN: 19.24
DLCO SINGLE BREATH PRE REF: 63.9 %
DLCO SINGLE BREATH REF: 26.17
DLCOC SBVA LLN: 2.74
DLCOC SBVA PRE REF: 85.5 %
DLCOC SBVA REF: 4.02
DLCOC SINGLE BREATH LLN: 19.24
DLCOC SINGLE BREATH PRE REF: 63.9 %
DLCOC SINGLE BREATH REF: 26.17
DLCOVA LLN: 2.74
DLCOVA PRE REF: 85.5 %
DLCOVA PRE: 3.44 ML/(MIN*MMHG*L) (ref 2.74–5.31)
DLCOVA REF: 4.02
DLVAADJ PRE: 3.44 ML/(MIN*MMHG*L) (ref 2.74–5.31)
ERVN2 LLN: -16448.92
ERVN2 PRE REF: 67.8 %
ERVN2 PRE: 0.74 L (ref -16448.92–16451.08)
ERVN2 REF: 1.08
FEF 25 75 LLN: 1.25
FEF 25 75 PRE REF: 109.8 %
FEF 25 75 REF: 2.63
FEV1 FVC LLN: 65
FEV1 FVC PRE REF: 103.4 %
FEV1 FVC REF: 78
FEV1 LLN: 2.36
FEV1 PRE REF: 89.5 %
FEV1 REF: 3.16
FRCN2 LLN: 2.46
FRCN2 PRE REF: 47.5 %
FRCN2 REF: 3.45
FVC LLN: 3.08
FVC PRE REF: 86.5 %
FVC REF: 4.07
IVC PRE: 3.45 L (ref 3.08–5.06)
IVC SINGLE BREATH LLN: 3.08
IVC SINGLE BREATH PRE REF: 84.7 %
IVC SINGLE BREATH REF: 4.07
PEF LLN: 6.28
PEF PRE REF: 76.4 %
PEF REF: 8.39
PRE DLCO: 16.74 ML/(MIN*MMHG) (ref 19.24–33.1)
PRE FEF 25 75: 2.88 L/S (ref 1.25–4)
PRE FET 100: 7.21 SEC
PRE FEV1 FVC: 80.24 % (ref 65.18–90.07)
PRE FEV1: 2.83 L (ref 2.36–3.96)
PRE FRC N2: 1.64 L
PRE FVC: 3.52 L (ref 3.08–5.06)
PRE PEF: 6.41 L/S (ref 6.28–10.51)
RVN2 LLN: 1.69
RVN2 PRE REF: 30.5 %
RVN2 PRE: 0.72 L (ref 1.69–3.04)
RVN2 REF: 2.36
RVN2TLCN2 LLN: 29.16
RVN2TLCN2 PRE REF: 44.2 %
RVN2TLCN2 PRE: 16.85 % (ref 29.16–47.12)
RVN2TLCN2 REF: 38.14
TLCN2 LLN: 5.35
TLCN2 PRE REF: 65.8 %
TLCN2 PRE: 4.28 L (ref 5.35–7.65)
TLCN2 REF: 6.5
VA PRE: 4.87 L (ref 6.35–6.35)
VA SINGLE BREATH LLN: 6.35
VA SINGLE BREATH PRE REF: 76.6 %
VA SINGLE BREATH REF: 6.35
VCMAXN2 LLN: 3.08
VCMAXN2 PRE REF: 87.4 %
VCMAXN2 PRE: 3.56 L (ref 3.08–5.06)
VCMAXN2 REF: 4.07

## 2022-03-24 ENCOUNTER — TELEPHONE (OUTPATIENT)
Dept: PODIATRY | Facility: CLINIC | Age: 62
End: 2022-03-24
Payer: COMMERCIAL

## 2022-03-24 NOTE — TELEPHONE ENCOUNTER
----- Message from Lisbet Jett sent at 3/24/2022  9:37 AM CDT -----  Contact: [phone]  Pt is calling to see if he needs to make a appt with the dr.    Pt stated that his PCP said he faxed over all the info from his test to the dr .    Pt would like a call back.    [phone]

## 2022-03-30 ENCOUNTER — OFFICE VISIT (OUTPATIENT)
Dept: PODIATRY | Facility: CLINIC | Age: 62
End: 2022-03-30
Payer: COMMERCIAL

## 2022-03-30 VITALS
DIASTOLIC BLOOD PRESSURE: 76 MMHG | SYSTOLIC BLOOD PRESSURE: 119 MMHG | BODY MASS INDEX: 34.53 KG/M2 | WEIGHT: 220.44 LBS | HEART RATE: 62 BPM

## 2022-03-30 DIAGNOSIS — M77.51: Primary | ICD-10-CM

## 2022-03-30 PROCEDURE — 99999 PR PBB SHADOW E&M-EST. PATIENT-LVL III: CPT | Mod: PBBFAC,,, | Performed by: PODIATRIST

## 2022-03-30 PROCEDURE — 20600 DRAIN/INJ JOINT/BURSA W/O US: CPT | Mod: RT,S$GLB,, | Performed by: PODIATRIST

## 2022-03-30 PROCEDURE — 3008F BODY MASS INDEX DOCD: CPT | Mod: CPTII,S$GLB,, | Performed by: PODIATRIST

## 2022-03-30 PROCEDURE — 99214 OFFICE O/P EST MOD 30 MIN: CPT | Mod: 25,S$GLB,, | Performed by: PODIATRIST

## 2022-03-30 PROCEDURE — 3078F DIAST BP <80 MM HG: CPT | Mod: CPTII,S$GLB,, | Performed by: PODIATRIST

## 2022-03-30 PROCEDURE — 3074F SYST BP LT 130 MM HG: CPT | Mod: CPTII,S$GLB,, | Performed by: PODIATRIST

## 2022-03-30 PROCEDURE — 3078F PR MOST RECENT DIASTOLIC BLOOD PRESSURE < 80 MM HG: ICD-10-PCS | Mod: CPTII,S$GLB,, | Performed by: PODIATRIST

## 2022-03-30 PROCEDURE — 3074F PR MOST RECENT SYSTOLIC BLOOD PRESSURE < 130 MM HG: ICD-10-PCS | Mod: CPTII,S$GLB,, | Performed by: PODIATRIST

## 2022-03-30 PROCEDURE — 20600 PR DRAIN/INJECT SMALL JOINT/BURSA: ICD-10-PCS | Mod: RT,S$GLB,, | Performed by: PODIATRIST

## 2022-03-30 PROCEDURE — 1159F MED LIST DOCD IN RCRD: CPT | Mod: CPTII,S$GLB,, | Performed by: PODIATRIST

## 2022-03-30 PROCEDURE — 99999 PR PBB SHADOW E&M-EST. PATIENT-LVL III: ICD-10-PCS | Mod: PBBFAC,,, | Performed by: PODIATRIST

## 2022-03-30 PROCEDURE — 99214 PR OFFICE/OUTPT VISIT, EST, LEVL IV, 30-39 MIN: ICD-10-PCS | Mod: 25,S$GLB,, | Performed by: PODIATRIST

## 2022-03-30 PROCEDURE — 3008F PR BODY MASS INDEX (BMI) DOCUMENTED: ICD-10-PCS | Mod: CPTII,S$GLB,, | Performed by: PODIATRIST

## 2022-03-30 PROCEDURE — 1159F PR MEDICATION LIST DOCUMENTED IN MEDICAL RECORD: ICD-10-PCS | Mod: CPTII,S$GLB,, | Performed by: PODIATRIST

## 2022-03-30 RX ORDER — DICLOFENAC SODIUM 10 MG/G
2 GEL TOPICAL 4 TIMES DAILY
Qty: 100 G | Refills: 2 | Status: SHIPPED | OUTPATIENT
Start: 2022-03-30

## 2022-03-30 RX ORDER — MELOXICAM 15 MG/1
15 TABLET ORAL DAILY
Qty: 30 TABLET | Refills: 0 | Status: SHIPPED | OUTPATIENT
Start: 2022-03-30

## 2022-04-03 NOTE — PROGRESS NOTES
Subjective:      Patient ID: Willie Roach is a 61 y.o. male.    Chief Complaint: Foot Pain (Pain for 3 months ) and Diabetes Mellitus (DM. Lucretia Kinney Mai, MD PCP - 1 WEEK AGO.)    Willie is a 61 y.o. male who presents to the podiatry clinic  with complaint of  right foot pain. Onset of the symptoms was several weeks ago. Precipitating event: none known. Current symptoms include: ability to bear weight, but with some pain. Aggravating factors: any weight bearing. Symptoms have gradually worsened. Patient has had no prior foot problems. Evaluation to date: none. Treatment to date: ice. Patients rates pain 6/10 on pain scale.        Review of Systems   Constitutional: Negative for chills, decreased appetite, fever and malaise/fatigue.   HENT: Negative for congestion, hearing loss, nosebleeds and tinnitus.    Eyes: Negative for double vision, pain, photophobia and visual disturbance.   Cardiovascular: Negative for chest pain, claudication, cyanosis and leg swelling.   Respiratory: Negative for cough, hemoptysis, shortness of breath and wheezing.    Endocrine: Negative for cold intolerance and heat intolerance.   Hematologic/Lymphatic: Negative for adenopathy and bleeding problem.   Skin: Negative for color change, dry skin, itching, nail changes and suspicious lesions.   Musculoskeletal: Positive for arthritis and joint pain. Negative for myalgias and stiffness.   Gastrointestinal: Negative for abdominal pain, jaundice, nausea and vomiting.   Genitourinary: Negative for dysuria, frequency and hematuria.   Neurological: Negative for difficulty with concentration, loss of balance, numbness, paresthesias and sensory change.   Psychiatric/Behavioral: Negative for altered mental status, hallucinations and suicidal ideas. The patient is not nervous/anxious.    Allergic/Immunologic: Negative for environmental allergies and persistent infections.           Objective:      Physical Exam  Constitutional:       Appearance:  He is well-developed.   HENT:      Head: Normocephalic and atraumatic.   Cardiovascular:      Pulses:           Dorsalis pedis pulses are 2+ on the right side and 2+ on the left side.        Posterior tibial pulses are 2+ on the right side and 2+ on the left side.   Pulmonary:      Effort: Pulmonary effort is normal.   Musculoskeletal:      Right foot: Normal range of motion. No deformity.      Left foot: Normal range of motion. No deformity.      Comments: Inspection and palpation of the muscles joints and bones of both lower extremities reveal that muscle strength for the anterior, lateral, and posterior muscle groups and intrinsic muscle groups of the foot are all 5 over 5 symmetrical.   Tenderness right 2nd metatarsophalangeal joint dorsal and plantar without instability.   Feet:      Right foot:      Skin integrity: No skin breakdown or erythema.      Left foot:      Skin integrity: No skin breakdown or erythema.   Skin:     General: Skin is warm and dry.      Nails: There is no clubbing.      Comments: Skin turgor is normal bilaterally. Skin texture is well hydrated to both lower extremities. No lesions or rashes or wounds appreciated bilaterally. Nail plates 1 through 5 bilaterally are within normal limits for length and thickness. No nail clubbing or incurvation noted.   Neurological:      Mental Status: He is alert and oriented to person, place, and time.      Deep Tendon Reflexes:      Reflex Scores:       Patellar reflexes are 2+ on the right side and 2+ on the left side.       Achilles reflexes are 2+ on the right side and 2+ on the left side.     Comments: Sharp, dull, light touch, vibratory, and proprioceptive sensation are intact bilaterally. Deep tendon reflexes to patellar and Achilles tendon are symmetrical, 2/4 bilaterally. No ankle clonus or Babinski reflexes noted bilaterally. Coordination is normal to both feet and lower extremities.   Psychiatric:         Behavior: Behavior normal.                Assessment:       Encounter Diagnosis   Name Primary?    Adhesive capsulitis of toe, right Yes         Plan:       Willie was seen today for foot pain and diabetes mellitus.    Diagnoses and all orders for this visit:    Adhesive capsulitis of toe, right    Other orders  -     diclofenac sodium (VOLTAREN) 1 % Gel; Apply 2 g topically 4 (four) times daily.  -     meloxicam (MOBIC) 15 MG tablet; Take 1 tablet (15 mg total) by mouth once daily.      I counseled the patient on his conditions, their implications and medical management.      A steroid injection was performed at right 2nd MPJ using 1% plain Lidocaine and 1 mL/40 mg of Kenalog. This was well tolerated.    Begin icing with topical and oral anti-inflammatory therapy and arch support with metatarsal pad.  Follow-up in 2 months.  .

## 2022-04-06 RX ORDER — TRIAMCINOLONE ACETONIDE 40 MG/ML
40 INJECTION, SUSPENSION INTRA-ARTICULAR; INTRAMUSCULAR
Status: COMPLETED | OUTPATIENT
Start: 2022-04-06 | End: 2022-04-06

## 2022-04-06 RX ADMIN — TRIAMCINOLONE ACETONIDE 40 MG: 40 INJECTION, SUSPENSION INTRA-ARTICULAR; INTRAMUSCULAR at 11:04

## 2022-05-17 ENCOUNTER — OFFICE VISIT (OUTPATIENT)
Dept: PODIATRY | Facility: CLINIC | Age: 62
End: 2022-05-17
Payer: COMMERCIAL

## 2022-05-17 VITALS
WEIGHT: 220.44 LBS | DIASTOLIC BLOOD PRESSURE: 82 MMHG | HEART RATE: 60 BPM | SYSTOLIC BLOOD PRESSURE: 131 MMHG | HEIGHT: 67 IN | BODY MASS INDEX: 34.6 KG/M2

## 2022-05-17 DIAGNOSIS — M77.51: Primary | ICD-10-CM

## 2022-05-17 PROCEDURE — 3075F PR MOST RECENT SYSTOLIC BLOOD PRESS GE 130-139MM HG: ICD-10-PCS | Mod: CPTII,S$GLB,, | Performed by: PODIATRIST

## 2022-05-17 PROCEDURE — 99214 PR OFFICE/OUTPT VISIT, EST, LEVL IV, 30-39 MIN: ICD-10-PCS | Mod: 25,S$GLB,, | Performed by: PODIATRIST

## 2022-05-17 PROCEDURE — 3008F PR BODY MASS INDEX (BMI) DOCUMENTED: ICD-10-PCS | Mod: CPTII,S$GLB,, | Performed by: PODIATRIST

## 2022-05-17 PROCEDURE — 1159F PR MEDICATION LIST DOCUMENTED IN MEDICAL RECORD: ICD-10-PCS | Mod: CPTII,S$GLB,, | Performed by: PODIATRIST

## 2022-05-17 PROCEDURE — 3075F SYST BP GE 130 - 139MM HG: CPT | Mod: CPTII,S$GLB,, | Performed by: PODIATRIST

## 2022-05-17 PROCEDURE — 1159F MED LIST DOCD IN RCRD: CPT | Mod: CPTII,S$GLB,, | Performed by: PODIATRIST

## 2022-05-17 PROCEDURE — 20605 PR DRAIN/INJECT INTERMEDIATE JOINT/BURSA: ICD-10-PCS | Mod: RT,S$GLB,, | Performed by: PODIATRIST

## 2022-05-17 PROCEDURE — 20605 DRAIN/INJ JOINT/BURSA W/O US: CPT | Mod: RT,S$GLB,, | Performed by: PODIATRIST

## 2022-05-17 PROCEDURE — 3079F DIAST BP 80-89 MM HG: CPT | Mod: CPTII,S$GLB,, | Performed by: PODIATRIST

## 2022-05-17 PROCEDURE — 99999 PR PBB SHADOW E&M-EST. PATIENT-LVL III: ICD-10-PCS | Mod: PBBFAC,,, | Performed by: PODIATRIST

## 2022-05-17 PROCEDURE — 99214 OFFICE O/P EST MOD 30 MIN: CPT | Mod: 25,S$GLB,, | Performed by: PODIATRIST

## 2022-05-17 PROCEDURE — 3079F PR MOST RECENT DIASTOLIC BLOOD PRESSURE 80-89 MM HG: ICD-10-PCS | Mod: CPTII,S$GLB,, | Performed by: PODIATRIST

## 2022-05-17 PROCEDURE — 3008F BODY MASS INDEX DOCD: CPT | Mod: CPTII,S$GLB,, | Performed by: PODIATRIST

## 2022-05-17 PROCEDURE — 99999 PR PBB SHADOW E&M-EST. PATIENT-LVL III: CPT | Mod: PBBFAC,,, | Performed by: PODIATRIST

## 2022-05-17 RX ORDER — METHYLPREDNISOLONE 4 MG/1
TABLET ORAL
Qty: 21 TABLET | Refills: 1 | Status: SHIPPED | OUTPATIENT
Start: 2022-05-17 | End: 2022-06-07

## 2022-05-17 RX ORDER — TRIAMCINOLONE ACETONIDE 40 MG/ML
40 INJECTION, SUSPENSION INTRA-ARTICULAR; INTRAMUSCULAR
Status: COMPLETED | OUTPATIENT
Start: 2022-05-17 | End: 2022-05-17

## 2022-05-17 RX ADMIN — TRIAMCINOLONE ACETONIDE 40 MG: 40 INJECTION, SUSPENSION INTRA-ARTICULAR; INTRAMUSCULAR at 09:05

## 2022-05-21 NOTE — PROGRESS NOTES
Subjective:      Patient ID: Willie Roach is a 62 y.o. male.    Chief Complaint: Follow-up (Right foot injection/Pcp-Zeenat 2/2/2022)    Willie is a 62 y.o. male who presents to the podiatry clinic  with complaint of  right foot pain. Onset of the symptoms was several weeks ago. Precipitating event: none known. Current symptoms include: ability to bear weight, but with some pain.  He is improving, he would like discussed a 2nd injection today.    Review of Systems   Constitutional: Negative for chills, decreased appetite, fever and malaise/fatigue.   HENT: Negative for congestion, hearing loss, nosebleeds and tinnitus.    Eyes: Negative for double vision, pain, photophobia and visual disturbance.   Cardiovascular: Negative for chest pain, claudication, cyanosis and leg swelling.   Respiratory: Negative for cough, hemoptysis, shortness of breath and wheezing.    Endocrine: Negative for cold intolerance and heat intolerance.   Hematologic/Lymphatic: Negative for adenopathy and bleeding problem.   Skin: Negative for color change, dry skin, itching, nail changes and suspicious lesions.   Musculoskeletal: Positive for arthritis and joint pain. Negative for myalgias and stiffness.   Gastrointestinal: Negative for abdominal pain, jaundice, nausea and vomiting.   Genitourinary: Negative for dysuria, frequency and hematuria.   Neurological: Negative for difficulty with concentration, loss of balance, numbness, paresthesias and sensory change.   Psychiatric/Behavioral: Negative for altered mental status, hallucinations and suicidal ideas. The patient is not nervous/anxious.    Allergic/Immunologic: Negative for environmental allergies and persistent infections.           Objective:      Physical Exam  Constitutional:       Appearance: He is well-developed.   HENT:      Head: Normocephalic and atraumatic.   Cardiovascular:      Pulses:           Dorsalis pedis pulses are 2+ on the right side and 2+ on the left side.         Posterior tibial pulses are 2+ on the right side and 2+ on the left side.   Pulmonary:      Effort: Pulmonary effort is normal.   Musculoskeletal:      Right foot: Normal range of motion. No deformity.      Left foot: Normal range of motion. No deformity.      Comments: Inspection and palpation of the muscles joints and bones of both lower extremities reveal that muscle strength for the anterior, lateral, and posterior muscle groups and intrinsic muscle groups of the foot are all 5 over 5 symmetrical.   Tenderness right 2nd metatarsophalangeal joint dorsal and plantar without instability.   Feet:      Right foot:      Skin integrity: No skin breakdown or erythema.      Left foot:      Skin integrity: No skin breakdown or erythema.   Skin:     General: Skin is warm and dry.      Nails: There is no clubbing.      Comments: Skin turgor is normal bilaterally. Skin texture is well hydrated to both lower extremities. No lesions or rashes or wounds appreciated bilaterally. Nail plates 1 through 5 bilaterally are within normal limits for length and thickness. No nail clubbing or incurvation noted.   Neurological:      Mental Status: He is alert and oriented to person, place, and time.      Deep Tendon Reflexes:      Reflex Scores:       Patellar reflexes are 2+ on the right side and 2+ on the left side.       Achilles reflexes are 2+ on the right side and 2+ on the left side.     Comments: Sharp, dull, light touch, vibratory, and proprioceptive sensation are intact bilaterally. Deep tendon reflexes to patellar and Achilles tendon are symmetrical, 2/4 bilaterally. No ankle clonus or Babinski reflexes noted bilaterally. Coordination is normal to both feet and lower extremities.   Psychiatric:         Behavior: Behavior normal.               Assessment:       Encounter Diagnosis   Name Primary?    Adhesive capsulitis of toe, right Yes         Plan:       Willie was seen today for follow-up.    Diagnoses and all orders for  this visit:    Adhesive capsulitis of toe, right    Other orders  -     methylPREDNISolone (MEDROL DOSEPACK) 4 mg tablet; use as directed  -     triamcinolone acetonide injection 40 mg      I counseled the patient on his conditions, their implications and medical management.    The nature of the condition, options for management, as well as potential risks and complications were discussed in detail with patient. Patient was amenable to my recommendations and left my office fully informed and will follow up as instructed or sooner if necessary.      I recommended patient be fitted for orthoses.  I explained that orthoses may improve function of the foot, reduce pain, decrease pronation, increase efficiency of muscle function of the foot and ankle and prevent surgery.  Alternative forms of biomechanical control of the foot and ankle were discussed with the patient.      A steroid injection was performed at right 2nd MPJ using 1% plain Lidocaine and 1 mL/40 mg of Kenalog. This was well tolerated.    Begin icing with topical and oral anti-inflammatory therapy and arch support with metatarsal pad.  Follow-up in 2 months.  .

## 2024-05-15 ENCOUNTER — HOSPITAL ENCOUNTER (OUTPATIENT)
Dept: RADIOLOGY | Facility: HOSPITAL | Age: 64
Discharge: HOME | End: 2024-05-15
Attending: ORTHOPAEDIC SURGERY
Payer: MEDICARE

## 2024-05-15 ENCOUNTER — OFFICE VISIT (OUTPATIENT)
Dept: SPORTS MEDICINE | Facility: CLINIC | Age: 64
End: 2024-05-15
Payer: MEDICARE

## 2024-05-15 VITALS
BODY MASS INDEX: 29.98 KG/M2 | HEART RATE: 71 BPM | WEIGHT: 191 LBS | SYSTOLIC BLOOD PRESSURE: 117 MMHG | HEIGHT: 67 IN | DIASTOLIC BLOOD PRESSURE: 78 MMHG

## 2024-05-15 DIAGNOSIS — M25.511 CHRONIC RIGHT SHOULDER PAIN: Primary | ICD-10-CM

## 2024-05-15 DIAGNOSIS — M25.511 RIGHT SHOULDER PAIN, UNSPECIFIED CHRONICITY: ICD-10-CM

## 2024-05-15 DIAGNOSIS — G89.29 CHRONIC RIGHT SHOULDER PAIN: Primary | ICD-10-CM

## 2024-05-15 PROCEDURE — 73030 X-RAY EXAM OF SHOULDER: CPT | Mod: 26,RT,, | Performed by: RADIOLOGY

## 2024-05-15 PROCEDURE — 99214 OFFICE O/P EST MOD 30 MIN: CPT | Mod: PBBFAC,25 | Performed by: ORTHOPAEDIC SURGERY

## 2024-05-15 PROCEDURE — 99999 PR PBB SHADOW E&M-EST. PATIENT-LVL IV: CPT | Mod: PBBFAC,,, | Performed by: ORTHOPAEDIC SURGERY

## 2024-05-15 PROCEDURE — 73030 X-RAY EXAM OF SHOULDER: CPT | Mod: TC,RT

## 2024-05-15 PROCEDURE — 99204 OFFICE O/P NEW MOD 45 MIN: CPT | Mod: 25,S$PBB,, | Performed by: ORTHOPAEDIC SURGERY

## 2024-05-15 PROCEDURE — 99999PBSHW PR PBB SHADOW TECHNICAL ONLY FILED TO HB: Mod: PBBFAC,,,

## 2024-05-15 PROCEDURE — 20610 DRAIN/INJ JOINT/BURSA W/O US: CPT | Mod: PBBFAC,RT | Performed by: ORTHOPAEDIC SURGERY

## 2024-05-15 RX ADMIN — TRIAMCINOLONE ACETONIDE 80 MG: 40 INJECTION, SUSPENSION INTRA-ARTICULAR; INTRAMUSCULAR at 08:05

## 2024-05-15 NOTE — PROGRESS NOTES
CC: right shoulder pain, patient is retired but works with [employer], [employer] and [employer]     64 y.o. Male RHD reports that the pain is severe and not responding to any conservative care.      On 6/16/2023 he underwent Right shoulder surgery with Dr. Cesar Fontana  Surgery included right shoulder rotator cuff repair, SAB, extensive debridement, and subpectoral biceps tenodesis    Patient notes that he improved following surgery and was doing well and was cleared to return to work     Beginning of 2024 he had a slip and fall and fell and caught his arm on a fence, he also was reaching in the back of his truck standing on the tire and slipped with his arm still reaching in the back     He now notes pain in his shoulder that is constant   He reports that the pain is worse with overhead activity and behind the back activity/reaching. It also bothers him at night.    He was recently seen by Dr. Fontana and referred to see Dr. Beltran for his shoulder following the review of his new MRI results. He was told he was sent to Dr. Beltran as he was the one who trained Dr. Fontana    Is affecting ADLs.     He has been using ice and heat, performing his HEP, and taking muscle relaxer, demerol, TENS unit    SANE: 64    Review of Systems   Constitution: Negative. Negative for chills, fever and night sweats.   HENT: Negative for congestion and headaches.    Eyes: Negative for blurred vision, left vision loss and right vision loss.   Cardiovascular: Negative for chest pain and syncope.   Respiratory: Negative for cough and shortness of breath.    Endocrine: Negative for polydipsia, polyphagia and polyuria.   Hematologic/Lymphatic: Negative for bleeding problem. Does not bruise/bleed easily.   Skin: Negative for dry skin, itching and rash.   Musculoskeletal: Negative for falls and muscle weakness.   Gastrointestinal: Negative for abdominal pain and bowel incontinence.   Genitourinary: Negative for bladder incontinence and  nocturia.   Neurological: Negative for disturbances in coordination, loss of balance and seizures.   Psychiatric/Behavioral: Negative for depression. The patient does not have insomnia.    Allergic/Immunologic: Negative for hives and persistent infections.     PAST MEDICAL HISTORY:   Past Medical History:   Diagnosis Date    Anxiety     Hyperlipidemia     Hypertension      PAST SURGICAL HISTORY:   Past Surgical History:   Procedure Laterality Date    BACK SURGERY      BUNIONECTOMY Right 4/4/2019    Procedure: BUNIONECTOMY;  Surgeon: Tyler Perez DPM;  Location: [address];  Service: Podiatry;  Laterality: Right;    CARPAL TUNNEL RELEASE      ELBOW SURGERY      EYE SURGERY      FRONTAL SINUS OBLITERATION  9/23/2019    Procedure: SINUSOTOMY, FRONTAL SINUS, OBLITERATIVE;  Surgeon: Jeramy Cuadra MD;  Location: [facility] OR [address] [place] [address];  Service: ENT;;    FUNCTIONAL ENDOSCOPIC SINUS SURGERY (FESS) Bilateral 9/23/2019    Procedure: FESS (FUNCTIONAL ENDOSCOPIC SINUS SURGERY);  Surgeon: Jeramy Cuadra MD;  Location: [facility] OR [address] [place] [address];  Service: ENT;  Laterality: Bilateral;  FESS set, septoplasty set    KNEE ARTHROSCOPY      MAXILLARY ANTROSTOMY Bilateral 9/23/2019    Procedure: MAXILLARY ANTROSTOMY;  Surgeon: Jeramy Cuadra MD;  Location: [facility] OR [address] [place] [address];  Service: ENT;  Laterality: Bilateral;    NASAL SEPTOPLASTY N/A 9/23/2019    Procedure: SEPTOPLASTY, NOSE;  Surgeon: Jeramy Cuadra MD;  Location: [address];  Service: ENT;  Laterality: N/A;    NASAL TURBINATE REDUCTION Bilateral 9/23/2019    Procedure: REDUCTION, NASAL TURBINATE;  Surgeon: Jeramy Cuadra MD;  Location: [address];  Service: ENT;  Laterality: Bilateral;     FAMILY HISTORY:   Family History   Problem Relation Name Age of Onset    Melanoma Neg Hx       SOCIAL HISTORY:   Social History     Socioeconomic History    Marital status:    Tobacco Use    Smoking status: Never    Smokeless tobacco: Never   Substance and Sexual Activity     Alcohol use: Yes     Comment: socially    Drug use: Never     Social Determinants of Health     Financial Resource Strain: Patient Declined (5/8/2024)    Overall Financial Resource Strain (CARDIA)     Difficulty of Paying Living Expenses: Patient declined   Food Insecurity: Patient Declined (5/8/2024)    Hunger Vital Sign     Worried About Running Out of Food in the Last Year: Patient declined     Ran Out of Food in the Last Year: Patient declined   Transportation Needs: No Transportation Needs (5/8/2024)    PRAPARE - Transportation     Lack of Transportation (Medical): No     Lack of Transportation (Non-Medical): No   Physical Activity: Unknown (5/8/2024)    Exercise Vital Sign     Days of Exercise per Week: 4 days     Minutes of Exercise per Session: Patient declined   Stress: Patient Declined (5/8/2024)    [employer] - Occupational Stress Questionnaire     Feeling of Stress : Patient declined   Housing Stability: Unknown (5/8/2024)    Housing Stability Vital Sign     Unable to Pay for Housing in the Last Year: Patient declined       MEDICATIONS:   Current Outpatient Medications:     albuterol (PROVENTIL/VENTOLIN HFA) 90 mcg/actuation inhaler, Inhale 2 puffs into the lungs as needed., Disp: , Rfl:     cetirizine (ZYRTEC) 10 MG tablet, Take 10 mg by mouth once daily., Disp: , Rfl: 0    diazePAM (VALIUM) 10 MG Tab, Take 10 mg by mouth as needed. , Disp: , Rfl: 1    diclofenac sodium (VOLTAREN) 1 % Gel, Apply 2 g topically 4 (four) times daily., Disp: 100 g, Rfl: 2    omeprazole (PRILOSEC) 40 MG capsule, Take 40 mg by mouth once daily., Disp: , Rfl:     semaglutide (OZEMPIC) 2 mg/dose (8 mg/3 mL) PnIj, Inject 2 mg into the skin every 7 days., Disp: , Rfl:     simvastatin (ZOCOR) 20 MG tablet, Take 20 mg by mouth every evening., Disp: , Rfl: 0    sodium chloride (OCEAN) 0.65 % nasal spray, 2 sprays by Nasal route every 6 (six) hours while awake., Disp: 2 Bottle, Rfl: 2    meloxicam  "(MOBIC) 15 MG tablet, Take 1 tablet (15 mg total) by mouth once daily. (Patient not taking: Reported on 5/15/2024), Disp: 30 tablet, Rfl: 0    metFORMIN (GLUCOPHAGE) 500 MG tablet, Take 500 mg by mouth once daily.  (Patient not taking: Reported on 5/15/2024), Disp: , Rfl:   ALLERGIES:   Review of patient's allergies indicates:   Allergen Reactions    Codeine Hives and Itching    Angi Rash       VITAL SIGNS: /78   Pulse 71   Ht 5' 7" (1.702 m)   Wt 86.6 kg (191 lb) Comment: patient refused scale  BMI 29.91 kg/m²      PHYSICAL EXAMINATION:  General:  The patient is alert and oriented x 3.  Mood is pleasant.  Observation of ears, eyes and nose reveal no gross abnormalities.  No labored breathing observed.  Gait is coordinated. Patient can toe walk and heel walk without difficulty.      right SHOULDER / UPPER EXTREMITY EXAM    OBSERVATION:     Swelling  none  Deformity  none   Discoloration  none   Scapular winging none   Scars   none  Atrophy  none    TENDERNESS / CREPITUS (T/C):          T/C      T/C   Clavicle   -/-  SUPRAspinatus    -/-     AC Jt.    -/-  INFRAspinatus  -/-    SC Jt.    -/-  Deltoid    -/-      G. Tuberosity  -/-  LH BICEP groove  +/-   Acromion:  -/-  Midline Neck   -/-     Scapular Spine -/-  Trapezium   -/-   SMA Scapula  -/-  GH jt. line - post  -/-     Scapulothoracic  -/-         ROM: (* = with pain)  Right shoulder   Left shoulder        AROM (PROM)   AROM (PROM)   FE    145° (155°)     170° (175°)     ER at 0°    35°  (40°)    60°  (65°)   ER at 90° ABD  90°  (90°)    90°  (90°)   IR at 90°  ABD   NA  (40°)     NA  (40°)      IR (spine level)   L5     T8    STRENGTH: (* = with pain) RIGHT SHOULDER  LEFT SHOULDER   SCAPTION at 0  4+/5    5/5   SCAPTION at 30  5/5    5/5    IR    5/5    5/5   ER    5/5    5/5   BICEPS   5/5    5/5   Deltoid    5/5    5/5     SIGNS:  Painful side       NEER   +    OFAROOQS  +    MILLARD   +    SPEEDS  +     DROP ARM   neg   BELLY " PRESS neg   Superior escape none    LIFT-OFF  neg   X-Body ADD    neg    MOVING VALGUS neg        STABILITY TESTING    RIGHT SHOULDER   LEFT SHOULDER     Translation     Anterior  up face     up face    Posterior  up face    up face    Sulcus   < 10mm    < 10 mm     Signs   Apprehension   neg      neg       Relocation   no change     no change      Jerk test  neg     neg    EXTREMITY NEURO-VASCULAR EXAM    Sensation grossly intact to light touch all dermatomal regions.    DTR 2+ Biceps, Triceps, BR and Negative Indras sign   Grossly intact motor function at Elbow, Wrist and Hand   Distal pulses radial and ulnar 2+, brisk cap refill, symmetric.      NECK:  Painless FROM and spinous processes non-tender. Negative Spurlings sign.      OTHER FINDINGS:  + scapular dyskinesia  Bear hug: + for pain     XRAYS reviewed and interpreted personally by me:  Shoulder trauma series right,  were obtained and reviewed  Glenohumeral joint space is normal and bony structures are intact. Patient has had previous shoulder surgery. Some degenerative changes seen at the acromioclavicular joint.     MRI reviewed and interpreted personally by me: external   1. Postoperative findings of a rotator cuff repair in the right shoulder ar notes and several anchors have bene placed in the lateral head of the humerus and the greater tuberosity of the humerus. some of the arthrographic contrast extends around the anchor placed in the anterolateral aspect of the head of the humerus (image 15, series 7). This raises the possibility of loosening of this anchor. However no displacement of this anchor is detected.  2. A high grade partial thickness tear is seen in the posterior aspect of the distal supraspinatus tendon crossing about 50-70% of the thickness of the tendon.   3. High grade partial thickness tear is observed in the anterior aspect of the distal infraspinatus tendon crossing about 80-99% of the thickness of the tendon .   4. Most of the  superior labrum is missing. A thin linear oblique tear is notes at the base of the posterior/inferior labrum. In addition an oblique partial thickness tear is notes more superiorly in the posterior labrum.   5. A partial thickness partial width tear involving the posterior/superior aspect of the distal subscapularis tendon is detected.   6. The tendon of the long head of the biceps muscle has been reattached to the anterior cortex of the proximal shaft of the humerus           ASSESSMENT:   right:  1. Shoulder pain, impingement   2.  Scapular dyskinesia    PLAN:    PROCEDURE NOTE:   After cortisone consent and post-injection information was given, the shoulder was prepped with betadyne and alcohol. The right subacromial space of the shoulder was injected with 2 cc 40 mg kenalog and 4 cc lidocaine and 4 cc .5% marcaine.   Bandaid was applied. Patient was reminded to rest with RICE for 48 hours post injection and to call clinic immediately for any adverse side effects as explained in clinic today.    Patient was warned of possible blood sugar and/or blood pressure changes during that time.    PT for scapular stabilization: Scapular dyskinesia   Scapular stabilization - Axel protocol, 1-3x/week x 6-8 weeks with HEP    Follow up in 8 weeks    All questions were answered, pt will contact us for questions or concerns in the interim.    I made the decision to obtain old records of the patient including previous notes and imaging. New imaging was ordered today of the extremity or extremities evaluated. I independently reviewed and interpreted the radiographs and/or MRIs today as well as prior imaging.

## 2024-05-20 RX ORDER — TRIAMCINOLONE ACETONIDE 40 MG/ML
80 INJECTION, SUSPENSION INTRA-ARTICULAR; INTRAMUSCULAR
Status: DISCONTINUED | OUTPATIENT
Start: 2024-05-15 | End: 2024-05-20 | Stop reason: HOSPADM

## 2024-05-20 NOTE — PROCEDURES
Large Joint Aspiration/Injection: R subacromial bursa    Date/Time: 5/15/2024 8:00 AM    Performed by: Lulu Lock MD  Authorized by: Lulu Lock MD    Consent Done?:  Yes (Verbal)  Indications:  Pain  Site marked: the procedure site was marked    Timeout: prior to procedure the correct patient, procedure, and site was verified    Prep: patient was prepped and draped in usual sterile fashion      Details:  Needle Size:  22 G  Approach:  Posterior  Location:  Shoulder  Site:  R subacromial bursa  Medications:  80 mg triamcinolone acetonide 40 mg/mL  Patient tolerance:  Patient tolerated the procedure well with no immediate complications

## 2024-06-10 ENCOUNTER — CLINICAL SUPPORT (OUTPATIENT)
Dept: REHABILITATION | Facility: HOSPITAL | Age: 64
End: 2024-06-10
Payer: MEDICARE

## 2024-06-10 DIAGNOSIS — M25.511 ACUTE PAIN OF RIGHT SHOULDER: Primary | ICD-10-CM

## 2024-06-10 PROCEDURE — 97161 PT EVAL LOW COMPLEX 20 MIN: CPT | Performed by: PHYSICAL THERAPIST

## 2024-06-10 NOTE — PLAN OF CARE
[facility]   Physical Therapy Initial Evaluation      Name: Shakeel Roach  Clinic Number: [id number]    Therapy Diagnosis:   Encounter Diagnosis   Name Primary?    Acute pain of right shoulder Yes        Physician: Lulu Lock MD    Physician Orders: PT Eval and Treat   Medical Diagnosis from Referral:   Diagnosis   M25.511,G89.29 (ICD-10-CM) - Chronic right shoulder pain     Evaluation Date: 6/10/2024  Authorization Period Expiration: 5/15/2025  Plan of Care Expiration: 9/10/2024  Progress Note Due: 9/10/2024  Visit # / Visits authorized: 1/ 1   FOTO: 1/1    Precautions: Standard     Time In: 1435  Time Out: 1530  Total Appointment Time (timed & untimed codes): 55 minutes    Subjective     Date of onset: few weeks   History of current condition - SHAKEEL reports: Patient underwent shoulder surgery 1 year ago. He tried to return to work but was still on pain meds and was unable to safely return. [place] not retired [place] He has pain with quick movements and with lifting anything heavy, especially into the back of his truck. He reports one doctor wants to do a second surgery but Dr. Lock does not think he needs a surgery.  Falls: none    Imaging: MRI studies:     MRI reviewed and interpreted personally by me: external   1. Postoperative findings of a rotator cuff repair in the right shoulder ar notes and several anchors have bene placed in the lateral head of the humerus and the greater tuberosity of the humerus. some of the arthrographic contrast extends around the anchor placed in the anterolateral aspect of the head of the humerus (image 15, series 7). This raises the possibility of loosening of this anchor. However no displacement of this anchor is detected.  2. A high grade partial thickness tear is seen in the posterior aspect of the distal supraspinatus tendon crossing about 50-70% of the thickness of the tendon.   3. High grade partial thickness tear  is observed in the anterior aspect of the distal infraspinatus tendon crossing about 80-99% of the thickness of the tendon .   4. Most of the superior labrum is missing. A thin linear oblique tear is notes at the base of the posterior/inferior labrum. In addition an oblique partial thickness tear is notes more superiorly in the posterior labrum.   5. A partial thickness partial width tear involving the posterior/superior aspect of the distal subscapularis tendon is detected.   6. The tendon of the long head of the biceps muscle has been reattached to the anterior cortex of the proximal shaft of the humerus           Prior Therapy: R shoulder  Social History: He lives with their family  Occupation: retired  Prior Level of Function: Independent  Current Level of Function: Ind pain lifting anything heavy and quick motions    Pain:  Current 3/10, worst 6/10, best 2/10   Location: right shoulder   Description: Aching and Deep  Aggravating Factors: Lifting  Easing Factors: rest    Patients goals: avoid a second surgery     Medical History:   Past Medical History:   Diagnosis Date    Anxiety     Hyperlipidemia     Hypertension        Surgical History:   Willie Roach  has a past surgical history that includes Eye surgery; Back surgery; Carpal tunnel release; Knee arthroscopy; Elbow surgery; Bunionectomy (Right, 4/4/2019); Functional endoscopic sinus surgery (FESS) (Bilateral, 9/23/2019); Nasal septoplasty (N/A, 9/23/2019); Nasal turbinate reduction (Bilateral, 9/23/2019); Maxillary antrostomy (Bilateral, 9/23/2019); and Frontal sinus obliteration (9/23/2019).    Medications:   Willie has a current medication list which includes the following prescription(s): albuterol, cetirizine, diazepam, diclofenac sodium, meloxicam, metformin, omeprazole, semaglutide, simvastatin, and sodium chloride.    Allergies:   Review of patient's allergies indicates:   Allergen Reactions    Codeine Hives and Itching    Angi Rash         Objective      Observation: Patient is a 64 y.o. male alert and oriented    Posture: Increased thoracic kyphosis    Passive Range of Motion:   Shoulder Right Left   Flexion 150 180   Abduction 120 180   ER at 0 35 50   ER at 90 60 90   IR 50 60      Active Range of Motion:   Shoulder Right Left   Flexion 140 180   Abduction 110 180   ER at 0 30 40   ER at 90 70 85   IR 40 50   Reach behind head c7 t2   Reach behind back  L5 L2     Strength:  Shoulder Right Left   Flexion 4 5   Abduction 4- 5   ER 4 5   IR 4 5   Serratus Anterior 4- 4   Middle Trap 4- 4   Low Trap 4- 4       Special Tests:  Impingement Cluster:   Right Left   Hawkin's Kenndy - -   Painful Arc + -   Resisted ER - -     Rotator Cuff Tear   Right Left   Painful Arc + -   Drop Arm test - -   Resisted ER - -     Instability:   Right Left   Anterior Apprehension Test - -   Relocation test - -   Posterior Apprehension Test - -   Sulcus Sign - -     SLAP:   Right Left   Biceps Load II - -   O'Salvador's Test + -     Other:   Right Left   Subscapularis Lift Off Test - -   Empty Can + -     Joint Mobility: Limited posterior glide to the shoulder    Palpation: tender over biceps tendon and posterior cuff    Sensation: intact    Flexibility:   Lat: R - ; L -   Pec Minor: R short ; L short   Intake Outcome Measure for FOTO Shoulder Survey    Therapist reviewed FOTO scores for Willie Roach on 6/10/2024.   FOTO documents entered into [employer] - see Media section.    Intake Score: 59%       Treatment     Total Treatment time (time-based codes) separate from Evaluation: 10 minutes     WILLIE received the treatments listed below:      neuromuscular re-education activities to improve: Sense, Proprioception, and Posture for 10 minutes. The following activities were included:  Sidelying ER 2 x 10  HEP review    Patient Education and Home Exercises     Education provided:   - improve cuff and periscap stabilization    Written Home Exercises Provided: yes. Exercises  were reviewed and SHAKEEL was able to demonstrate them prior to the end of the session.  SHAKEEL demonstrated good understanding of the education provided. See EMR under Patient Instructions for exercises provided during therapy sessions.    Assessment     Shakeel is a 64 y.o. male referred to outpatient Physical Therapy with a medical diagnosis of chronic shoulder pain. Patient presents with limited shoulder motion and strength, pain in the cuff with lifting, difficulty completing ADL without pain and limited exercise endurance.     Patient prognosis is Good.   Patient will benefit from skilled outpatient Physical Therapy to address the deficits stated above and in the chart below, provide patient /family education, and to maximize patientt's level of independence.     Plan of care discussed with patient: Yes  Patient's spiritual, cultural and educational needs considered and patient is agreeable to the plan of care and goals as stated below:     Anticipated Barriers for therapy: none    Medical Necessity is demonstrated by the following  History  Co-morbidities and personal factors that may impact the plan of care [x] LOW: no personal factors / co-morbidities  [] MODERATE: 1-2 personal factors / co-morbidities  [] HIGH: 3+ personal factors / co-morbidities    Moderate / High Support Documentation:   Co-morbidities affecting plan of care:     Personal Factors:   no deficits     Examination  Body Structures and Functions, activity limitations and participation restrictions that may impact the plan of care [x] LOW: addressing 1-2 elements  [] MODERATE: 3+ elements  [] HIGH: 4+ elements (please support below)    Moderate / High Support Documentation:      Clinical Presentation [x] LOW: stable  [] MODERATE: Evolving  [] HIGH: Unstable     Decision Making/ Complexity Score: Low       GOALS: Short Term Goals:  4 weeks  1.Report decreased shoulder pain < / =  2/10  to increase tolerance for return to lifting to shoulder  height  2. Increase PROM 0-160 deg flexion   3. Increased strength by 1/3 MMT grade in cuff to increase tolerance for ADL and work activities.  4. Pt to tolerate HEP to improve ROM and independence with ADL's    Long Term Goals: 8 weeks  1.Report decreased shoulder pain  < / =  0 /10  to increase tolerance for return to overhead motion   2.Increase AROM to reaching overhead without shoulder pain  3.Increase strength to >/= 4/5 in cuff and scap stabilizers to increase tolerance for ADL and work activities.  4. Pt goal: return to lifting without shoulder pain   5. Pt will have improved gcode of CJ (20-40% limited) on FOTO shoulder in order to demonstrate true functional improvement.   Plan     Plan of care Certification: 6/10/2024 to 9/10/2024.    Outpatient Physical Therapy 2 times weekly for 10 weeks to include the following interventions: Manual Therapy, Moist Heat/ Ice, Neuromuscular Re-ed, Patient Education, Self Care, Therapeutic Activities, and Therapeutic Exercise.     Marco Antonio Oleary, PT, DPT, OCS, FAAOMPT

## 2024-06-18 NOTE — PROGRESS NOTES
"  Physical Therapy Daily Treatment Note     Name: Willie Roach  Clinic Number: [id number]  Therapy Diagnosis:        Encounter Diagnosis   Name Primary?    Acute pain of right shoulder Yes        Physician: Lulu Lock MD     Physician Orders: PT Eval and Treat   Medical Diagnosis from Referral:   Diagnosis   M25.511,G89.29 (ICD-10-CM) - Chronic right shoulder pain      Evaluation Date: 6/10/2024  Authorization Period Expiration: 5/15/2025  Plan of Care Expiration: 9/10/2024  Progress Note Due: 9/10/2024  Visit # / Visits authorized: 1/ 1   FOTO: 1/1     Precautions: Standard      Time In: 0800  Time Out: 0900  Total Appointment Time (timed & untimed codes): 55 minutes  Billable 30'     Subjective     Pt reports: min pain at rest going up with shld flex and lifting over tailgate  He was compliant with home exercise program.  Response to previous treatment: no issues   Functional change: pain with lift items into truck     Pain: 1/10  Location: right shoulder      Objective     German received therapeutic exercises to develop strength, endurance, ROM, flexibility, posture, and core stabilization for  minutes including:      German received the following manual therapy techniques: Joint mobilizations, Manual traction, and Soft tissue Mobilization were applied to the: R shld  for 10 minutes, including:  Scapular mobs, GHJ mobs, PROM       German participated in neuromuscular re-education activities to improve: Balance, Coordination, Kinesthetic, Sense, Proprioception, and Posture for  minutes. The following activities were included:  SL ER 1# 3x10/3"   OTB ER/IR stepouts 30x ea   OTB shld ext 30x     German participated in dynamic functional therapeutic activities to improve functional performance for   minutes, including:  UBE 5'/5' push/pull     Home Exercises Provided and Patient Education Provided     Education provided:   - compliance with HEP     Written Home Exercises Provided: yes.  Exercises were reviewed " and German was able to demonstrate them prior to the end of the session.  German demonstrated good  understanding of the education provided.       Assessment   Pt tolerating tx well. No increased pain symptoms noted during tx. Continue with improved ROM and periscapular strengthening. Vc/TC for correcting form/technique. Progress as tolerated.     German Is progressing well towards his goals.   Pt prognosis is Good.     Pt will continue to benefit from skilled outpatient physical therapy to address the deficits listed in the problem list box on initial evaluation, provide pt/family education and to maximize pt's level of independence in the home and community environment.     Pt's spiritual, cultural and educational needs considered and pt agreeable to plan of care and goals.    Anticipated barriers to physical therapy: none     Goals: GOALS: Short Term Goals:  4 weeks  1.Report decreased shoulder pain < / =  2/10  to increase tolerance for return to lifting to shoulder height  2. Increase PROM 0-160 deg flexion   3. Increased strength by 1/3 MMT grade in cuff to increase tolerance for ADL and work activities.  4. Pt to tolerate HEP to improve ROM and independence with ADL's     Long Term Goals: 8 weeks  1.Report decreased shoulder pain  < / =  0 /10  to increase tolerance for return to overhead motion   2.Increase AROM to reaching overhead without shoulder pain  3.Increase strength to >/= 4/5 in cuff and scap stabilizers to increase tolerance for ADL and work activities.  4. Pt goal: return to lifting without shoulder pain   5. Pt will have improved gcode of CJ (20-40% limited) on FOTO shoulder in order to demonstrate true functional improvement.sa    Plan     Continue with POC     Everett Angulo, PTA, STS

## 2024-06-19 ENCOUNTER — CLINICAL SUPPORT (OUTPATIENT)
Dept: REHABILITATION | Facility: HOSPITAL | Age: 64
End: 2024-06-19
Payer: MEDICARE

## 2024-06-19 DIAGNOSIS — M25.511 ACUTE PAIN OF RIGHT SHOULDER: Primary | ICD-10-CM

## 2024-06-19 PROCEDURE — 97530 THERAPEUTIC ACTIVITIES: CPT | Mod: CQ

## 2024-06-19 PROCEDURE — 97112 NEUROMUSCULAR REEDUCATION: CPT | Mod: CQ

## 2024-06-26 ENCOUNTER — CLINICAL SUPPORT (OUTPATIENT)
Dept: REHABILITATION | Facility: HOSPITAL | Age: 64
End: 2024-06-26
Payer: MEDICARE

## 2024-06-26 DIAGNOSIS — M25.511 ACUTE PAIN OF RIGHT SHOULDER: Primary | ICD-10-CM

## 2024-06-26 PROCEDURE — 97530 THERAPEUTIC ACTIVITIES: CPT | Mod: CQ

## 2024-06-26 PROCEDURE — 97112 NEUROMUSCULAR REEDUCATION: CPT | Mod: CQ

## 2024-06-26 NOTE — PROGRESS NOTES
"  Physical Therapy Daily Treatment Note     Name: Willie Roach  Clinic Number: [id number]  Therapy Diagnosis:        Encounter Diagnosis   Name Primary?    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  He was compliant with home exercise program.  Response to previous treatment: no issues   Functional change: laid floors     Pain: 1/10  Location: right shoulder      Objective     German received therapeutic exercises to develop strength, endurance, ROM, flexibility, posture, and core stabilization for  minutes including:      German received the following manual therapy techniques: Joint mobilizations, Manual traction, and Soft tissue Mobilization were applied to the: R shld  for 10 minutes, including:  Scapular mobs, GHJ mobs, PROM       German participated in neuromuscular re-education activities to improve: Balance, Coordination, Kinesthetic, Sense, Proprioception, and Posture for 20 minutes. The following activities were included:  SL ER 1# 3x10/5"   OTB ER/IR stepouts 30x ea   OTB shld ext 30x/3"  GTB rows 30x/3"    German participated in dynamic functional therapeutic activities to improve functional performance for 30 minutes, including:  UBE 5'/5' push/pull   Pulley shld flexion 5'   Box lift  9# chair <> counter pivoting 2x20    Home Exercises Provided and Patient Education Provided     Education provided:   - compliance with HEP     Written " Home Exercises Provided: yes.  Exercises were reviewed and German was able to demonstrate them prior to the end of the session.  German demonstrated good  understanding of the education provided.       Assessment   Pt tolerating tx well. No increased pain symptoms noted during tx. Continue with improved ROM and periscapular strengthening. Vc/TC for correcting form/technique. Progress as tolerated.     German Is progressing well towards his goals.   Pt prognosis is Good.     Pt will continue to benefit from skilled outpatient physical therapy to address the deficits listed in the problem list box on initial evaluation, provide pt/family education and to maximize pt's level of independence in the home and community environment.     Pt's spiritual, cultural and educational needs considered and pt agreeable to plan of care and goals.    Anticipated barriers to physical therapy: none     Goals: GOALS: Short Term Goals:  4 weeks  1.Report decreased shoulder pain < / =  2/10  to increase tolerance for return to lifting to shoulder height  2. Increase PROM 0-160 deg flexion   3. Increased strength by 1/3 MMT grade in cuff to increase tolerance for ADL and work activities.  4. Pt to tolerate HEP to improve ROM and independence with ADL's     Long Term Goals: 8 weeks  1.Report decreased shoulder pain  < / =  0 /10  to increase tolerance for return to overhead motion   2.Increase AROM to reaching overhead without shoulder pain  3.Increase strength to >/= 4/5 in cuff and scap stabilizers to increase tolerance for ADL and work activities.  4. Pt goal: return to lifting without shoulder pain   5. Pt will have improved gcode of CJ (20-40% limited) on FOTO shoulder in order to demonstrate true functional improvement.sa    Plan     Continue with POC     Everett Angulo, PTA, STS

## 2024-07-10 ENCOUNTER — CLINICAL SUPPORT (OUTPATIENT)
Dept: REHABILITATION | Facility: HOSPITAL | Age: 64
End: 2024-07-10
Payer: MEDICARE

## 2024-07-10 DIAGNOSIS — M25.511 ACUTE PAIN OF RIGHT SHOULDER: Primary | ICD-10-CM

## 2024-07-10 PROCEDURE — 97112 NEUROMUSCULAR REEDUCATION: CPT | Mod: CQ

## 2024-07-10 PROCEDURE — 97530 THERAPEUTIC ACTIVITIES: CPT | Mod: CQ

## 2024-07-10 PROCEDURE — 97140 MANUAL THERAPY 1/> REGIONS: CPT | Mod: CQ

## 2024-07-10 NOTE — PROGRESS NOTES
Physical Therapy Daily Treatment Note     Name: Willie Roach  Clinic Number: [id number]  Therapy Diagnosis:        Encounter Diagnosis   Name Primary?    Acute pain of right shoulder Yes        Physician: Lulu Lock MD     Physician Orders: PT Eval and Treat   Medical Diagnosis from Referral:   Diagnosis   M25.511,G89.29 (ICD-10-CM) - Chronic right shoulder pain      Evaluation Date: 6/10/2024  Authorization Period Expiration: 5/15/2025  Plan of Care Expiration: 9/10/2024  Progress Note Due: 9/10/2024  Visit # / Visits authorized: 1/ 1   FOTO: 1/1     Precautions: Standard      Time In: 0800  Time Out: 0900  Total Appointment Time (timed & untimed codes): 55 minutes  Billable 30'     Subjective     Pt reports:  improved motion with decreased pain   He was compliant with home exercise program.  Response to previous treatment: no issues   Functional change:  improved functional use of R shld     Pain: 1/10  Location: right shoulder      Objective   Pt reassessed by Supervising PT today prior to tx by PTA.     Passive Range of Motion:   Shoulder Right Left   Flexion 150 180   Abduction 120 180   ER at 0 45 50   ER at 90 80 90   IR 50 60     Active Range of Motion:   Shoulder Right Left   Flexion 160 180   Abduction 150 180   ER at 0 40 40   ER at 90 80 85   IR 40 50   Reach behind head c7 t2   Reach behind back  L5 L2     Strength:  Shoulder Right Left   Flexion 4 5   Abduction 4- 5   ER 4 5   IR 4 5     Special Tests:  Impingement Cluster:    Right Left   Hawkin's Kenndy - -   Painful Arc - -   Resisted ER - -     SLAP:    Right Left   Biceps Load II - -   O'Salvador's Test + -         German received therapeutic exercises to develop strength, endurance, ROM, flexibility, posture, and core stabilization for  minutes including:    German received the following manual therapy techniques: Joint mobilizations, Manual traction, and Soft tissue Mobilization were applied to the: R shld  for 10 minutes,  "including:  Scapular mobs, GHJ mobs, PROM       German participated in neuromuscular re-education activities to improve: Balance, Coordination, Kinesthetic, Sense, Proprioception, and Posture for 30 minutes. The following activities were included:  SL ER 2# 3x10/3"  GTB IR/ER 30x   GTB ER/IR stepouts 30x ea   GTB shld ext 30x/3"  GTB rows 30x/3"    German participated in dynamic functional therapeutic activities to improve functional performance for 20 minutes, including:  UBE 5'/5' push/pull   Pulley shld flexion 5'   Box lift  24#  floor <> counter pivoting 10    Home Exercises Provided and Patient Education Provided     Education provided:   - compliance with HEP     Written Home Exercises Provided: yes.  Exercises were reviewed and German was able to demonstrate them prior to the end of the session.  German demonstrated good  understanding of the education provided.       Assessment   Pt tolerating tx well. No increased pain symptoms noted during tx. Demonstrating improved strength with increased wt with several exercise including lifting objects from floor.Continue with improved ROM and periscapular strengthening. VC/TC for correcting form/technique. Progress as tolerated.     German Is progressing well towards his goals.   Pt prognosis is Good.     Pt will continue to benefit from skilled outpatient physical therapy to address the deficits listed in the problem list box on initial evaluation, provide pt/family education and to maximize pt's level of independence in the home and community environment.     Pt's spiritual, cultural and educational needs considered and pt agreeable to plan of care and goals.    Anticipated barriers to physical therapy: none     Goals: GOALS: Short Term Goals:  4 weeks  1.Report decreased shoulder pain < / =  2/10  to increase tolerance for return to lifting to shoulder height  2. Increase PROM 0-160 deg flexion   3. Increased strength by 1/3 MMT grade in cuff to increase tolerance " for ADL and work activities.  4. Pt to tolerate HEP to improve ROM and independence with ADL's     Long Term Goals: 8 weeks  1.Report decreased shoulder pain  < / =  0 /10  to increase tolerance for return to overhead motion   2.Increase AROM to reaching overhead without shoulder pain  3.Increase strength to >/= 4/5 in cuff and scap stabilizers to increase tolerance for ADL and work activities.  4. Pt goal: return to lifting without shoulder pain   5. Pt will have improved gcode of CJ (20-40% limited) on FOTO shoulder in order to demonstrate true functional improvement.sa    Plan     Continue with POC     Everett Angulo, PTA, STS

## 2024-07-17 ENCOUNTER — CLINICAL SUPPORT (OUTPATIENT)
Dept: REHABILITATION | Facility: HOSPITAL | Age: 64
End: 2024-07-17
Payer: MEDICARE

## 2024-07-17 DIAGNOSIS — M25.511 ACUTE PAIN OF RIGHT SHOULDER: Primary | ICD-10-CM

## 2024-07-17 PROCEDURE — 97530 THERAPEUTIC ACTIVITIES: CPT | Mod: CQ

## 2024-07-17 PROCEDURE — 97112 NEUROMUSCULAR REEDUCATION: CPT | Mod: CQ

## 2024-07-17 NOTE — PROGRESS NOTES
Physical Therapy Daily Treatment Note     Name: Willie Roach  Clinic Number: [id number]  Therapy Diagnosis:        Encounter Diagnosis   Name Primary?    Acute pain of right shoulder Yes        Physician: Lulu Lock MD     Physician Orders: PT Eval and Treat   Medical Diagnosis from Referral:   Diagnosis   M25.511,G89.29 (ICD-10-CM) - Chronic right shoulder pain      Evaluation Date: 6/10/2024  Authorization Period Expiration: 5/15/2025  Plan of Care Expiration: 9/10/2024  Progress Note Due: 9/10/2024  Visit # / Visits authorized: 1/ 1   FOTO: 1/1     Precautions: Standard      Time In: 0800  Time Out: 0900  Total Appointment Time (timed & untimed codes): 55 minutes  Billable 30'     Subjective     Pt reports:  got aches and pain but shld is doing alright  He was compliant with home exercise program.  Response to previous treatment: no issues   Functional change:  improved functional use of R shld     Pain: 1/10  Location: right shoulder      Objective       Passive Range of Motion:   Shoulder Right Left   Flexion 150 180   Abduction 120 180   ER at 0 45 50   ER at 90 80 90   IR 50 60     Active Range of Motion:   Shoulder Right Left   Flexion 160 180   Abduction 150 180   ER at 0 40 40   ER at 90 80 85   IR 40 50   Reach behind head c7 t2   Reach behind back  L5 L2     Strength:  Shoulder Right Left   Flexion 4 5   Abduction 4- 5   ER 4 5   IR 4 5     Special Tests:  Impingement Cluster:    Right Left   Hawkin's Kenndy - -   Painful Arc - -   Resisted ER - -     SLAP:    Right Left   Biceps Load II - -   O'Salvador's Test + -         German received therapeutic exercises to develop strength, endurance, ROM, flexibility, posture, and core stabilization for  minutes including:    German received the following manual therapy techniques: Joint mobilizations, Manual traction, and Soft tissue Mobilization were applied to the: R shld  for 10 minutes, including:  Scapular mobs, GHJ mobs, PROM       German participated  "in neuromuscular re-education activities to improve: Balance, Coordination, Kinesthetic, Sense, Proprioception, and Posture for 30 minutes. The following activities were included:  Prone IR lifting  2x20/3"  SL ER 2# 3x10/3"  GTB IR/ER 30x   GTB ER/IR stepouts 30x ea   GTB shld ext 30x/3"  GTB rows 30x/3"    German participated in dynamic functional therapeutic activities to improve functional performance for 15 minutes, including:  UBE 5'/5' push/pull   Pulley shld flexion 5'   Box lift  24#  floor <> counter pivoting 10 np    Home Exercises Provided and Patient Education Provided     Education provided:   - compliance with HEP     Written Home Exercises Provided: yes.  Exercises were reviewed and German was able to demonstrate them prior to the end of the session.  German demonstrated good  understanding of the education provided.       Assessment   Pt tolerating tx well. No increased pain symptoms noted during tx. Demonstrating improved strength with increased wt with several exercise including lifting objects from floor.Continue with improved ROM and periscapular strengthening. VC/TC for correcting form/technique. Progress as tolerated.     German Is progressing well towards his goals.   Pt prognosis is Good.     Pt will continue to benefit from skilled outpatient physical therapy to address the deficits listed in the problem list box on initial evaluation, provide pt/family education and to maximize pt's level of independence in the home and community environment.     Pt's spiritual, cultural and educational needs considered and pt agreeable to plan of care and goals.    Anticipated barriers to physical therapy: none     Goals: GOALS: Short Term Goals:  4 weeks  1.Report decreased shoulder pain < / =  2/10  to increase tolerance for return to lifting to shoulder height  2. Increase PROM 0-160 deg flexion   3. Increased strength by 1/3 MMT grade in cuff to increase tolerance for ADL and work activities.  4. Pt " to tolerate HEP to improve ROM and independence with ADL's     Long Term Goals: 8 weeks  1.Report decreased shoulder pain  < / =  0 /10  to increase tolerance for return to overhead motion   2.Increase AROM to reaching overhead without shoulder pain  3.Increase strength to >/= 4/5 in cuff and scap stabilizers to increase tolerance for ADL and work activities.  4. Pt goal: return to lifting without shoulder pain   5. Pt will have improved gcode of CJ (20-40% limited) on FOTO shoulder in order to demonstrate true functional improvement.sa    Plan     Continue with POC     Everett Angulo, PTA, STS

## 2024-07-26 ENCOUNTER — OFFICE VISIT (OUTPATIENT)
Dept: SPORTS MEDICINE | Facility: CLINIC | Age: 64
End: 2024-07-26
Payer: MEDICARE

## 2024-07-26 VITALS
SYSTOLIC BLOOD PRESSURE: 115 MMHG | BODY MASS INDEX: 29.98 KG/M2 | HEART RATE: 64 BPM | HEIGHT: 67 IN | DIASTOLIC BLOOD PRESSURE: 72 MMHG | WEIGHT: 191 LBS

## 2024-07-26 DIAGNOSIS — M25.511 CHRONIC RIGHT SHOULDER PAIN: Primary | ICD-10-CM

## 2024-07-26 DIAGNOSIS — G89.29 CHRONIC RIGHT SHOULDER PAIN: Primary | ICD-10-CM

## 2024-07-26 PROCEDURE — 99213 OFFICE O/P EST LOW 20 MIN: CPT | Mod: PBBFAC | Performed by: ORTHOPAEDIC SURGERY

## 2024-07-26 PROCEDURE — 99999 PR PBB SHADOW E&M-EST. PATIENT-LVL III: CPT | Mod: PBBFAC,,, | Performed by: ORTHOPAEDIC SURGERY

## 2024-07-26 NOTE — PROGRESS NOTES
CC: right shoulder pain, patient is retired but works with [employer], [employer]    Patient follow up today for repeat evaluation of his right shoulder. He reports that the combination of the injection as well as therapy has improved his shoulder.     Previous History:   64 y.o. Male RHD reports that the pain is severe and not responding to any conservative care.      On 6/16/2023 he underwent Right shoulder surgery with Dr. Cesar Fontana done in an excellent fashion  Surgery included right shoulder rotator cuff repair, SAB, extensive debridement, and subpectoral biceps tenodesis    Patient notes that he improved following surgery and was doing well and was cleared to return to work     Beginning of 2024 he had a slip and fall and fell and caught his arm on a fence, he also was reaching in the back of his truck standing on the tire and slipped with his arm still reaching in the back     He now notes pain in his shoulder that is constant   He reports that the pain is worse with overhead activity and behind the back activity/reaching. It also bothers him at night.    He was recently seen by Dr. Fontana and referred to see Dr. Beltran for his shoulder following the review of his new MRI results. He was told he was sent to Dr. Beltran as he was the one who trained Dr. Fontana    Is affecting ADLs.     He has been using ice and heat, performing his HEP, and taking muscle relaxer, demerol, TENS unit    Prior SANE: 65  Current SANE: 85  VAS: 1/10    Review of Systems   Constitution: Negative. Negative for chills, fever and night sweats.   HENT: Negative for congestion and headaches.    Eyes: Negative for blurred vision, left vision loss and right vision loss.   Cardiovascular: Negative for chest pain and syncope.   Respiratory: Negative for cough and shortness of breath.    Endocrine: Negative for polydipsia, polyphagia and polyuria.   Hematologic/Lymphatic: Negative for bleeding problem. Does not  bruise/bleed easily.   Skin: Negative for dry skin, itching and rash.   Musculoskeletal: Negative for falls and muscle weakness.   Gastrointestinal: Negative for abdominal pain and bowel incontinence.   Genitourinary: Negative for bladder incontinence and nocturia.   Neurological: Negative for disturbances in coordination, loss of balance and seizures.   Psychiatric/Behavioral: Negative for depression. The patient does not have insomnia.    Allergic/Immunologic: Negative for hives and persistent infections.     PAST MEDICAL HISTORY:   Past Medical History:   Diagnosis Date    Anxiety     Hyperlipidemia     Hypertension      PAST SURGICAL HISTORY:   Past Surgical History:   Procedure Laterality Date    BACK SURGERY      BUNIONECTOMY Right 4/4/2019    Procedure: BUNIONECTOMY;  Surgeon: Tyler Perez DPM;  Location: [address];  Service: Podiatry;  Laterality: Right;    CARPAL TUNNEL RELEASE      ELBOW SURGERY      EYE SURGERY      FRONTAL SINUS OBLITERATION  9/23/2019    Procedure: SINUSOTOMY, FRONTAL SINUS, OBLITERATIVE;  Surgeon: Jeramy Cuadra MD;  Location: [address];  Service: ENT;;    FUNCTIONAL ENDOSCOPIC SINUS SURGERY (FESS) Bilateral 9/23/2019    Procedure: FESS (FUNCTIONAL ENDOSCOPIC SINUS SURGERY);  Surgeon: Jeramy Cuadra MD;  Location: [address];  Service: ENT;  Laterality: Bilateral;  FESS set, septoplasty set    KNEE ARTHROSCOPY      MAXILLARY ANTROSTOMY Bilateral 9/23/2019    Procedure: MAXILLARY ANTROSTOMY;  Surgeon: Jeramy Cuadra MD;  Location: [address];  Service: ENT;  Laterality: Bilateral;    NASAL SEPTOPLASTY N/A 9/23/2019    Procedure: SEPTOPLASTY, NOSE;  Surgeon: Jeramy Cuadra MD;  Location: [address];  Service: ENT;  Laterality: N/A;    NASAL TURBINATE REDUCTION Bilateral 9/23/2019    Procedure: REDUCTION, NASAL TURBINATE;  Surgeon: Jeramy Cuadra MD;  Location: [address];  Service: ENT;  Laterality: Bilateral;     FAMILY HISTORY:   Family History    Problem Relation Name Age of Onset    Melanoma Neg Hx       SOCIAL HISTORY:   Social History     Socioeconomic History    Marital status:    Tobacco Use    Smoking status: Never    Smokeless tobacco: Never   Substance and Sexual Activity    Alcohol use: Yes     Comment: socially    Drug use: Never     Social Determinants of Health     Financial Resource Strain: Patient Declined (7/22/2024)    Overall Financial Resource Strain (CARDIA)     Difficulty of Paying Living Expenses: Patient declined   Food Insecurity: Patient Declined (7/22/2024)    Hunger Vital Sign     Worried About Running Out of Food in the Last Year: Patient declined     Ran Out of Food in the Last Year: Patient declined   Transportation Needs: Patient Declined (7/7/2024)    [facility]    PRAPARE - Transportation     Lack of Transportation (Medical): Patient declined     Lack of Transportation (Non-Medical): Patient declined   Physical Activity: Patient Declined (7/22/2024)    Exercise Vital Sign     Days of Exercise per Week: Patient declined     Minutes of Exercise per Session: Patient declined   Stress: Patient Declined (7/22/2024)    [employer] - Occupational Stress Questionnaire     Feeling of Stress : Patient declined   Housing Stability: Unknown (7/22/2024)    Housing Stability Vital Sign     Unable to Pay for Housing in the Last Year: Patient declined       MEDICATIONS:   Current Outpatient Medications:     albuterol (PROVENTIL/VENTOLIN HFA) 90 mcg/actuation inhaler, Inhale 2 puffs into the lungs as needed., Disp: , Rfl:     cetirizine (ZYRTEC) 10 MG tablet, Take 10 mg by mouth once daily., Disp: , Rfl: 0    diazePAM (VALIUM) 10 MG Tab, Take 10 mg by mouth as needed. , Disp: , Rfl: 1    diclofenac sodium (VOLTAREN) 1 % Gel, Apply 2 g topically 4 (four) times daily., Disp: 100 g, Rfl: 2    omeprazole (PRILOSEC) 40 MG capsule, Take 40 mg by mouth once daily., Disp: , Rfl:      "semaglutide (OZEMPIC) 2 mg/dose (8 mg/3 mL) PnIj, Inject 2 mg into the skin every 7 days., Disp: , Rfl:     simvastatin (ZOCOR) 20 MG tablet, Take 20 mg by mouth every evening., Disp: , Rfl: 0    sodium chloride (OCEAN) 0.65 % nasal spray, 2 sprays by Nasal route every 6 (six) hours while awake., Disp: 2 Bottle, Rfl: 2    meloxicam (MOBIC) 15 MG tablet, Take 1 tablet (15 mg total) by mouth once daily. (Patient not taking: Reported on 5/15/2024), Disp: 30 tablet, Rfl: 0    metFORMIN (GLUCOPHAGE) 500 MG tablet, Take 500 mg by mouth once daily.  (Patient not taking: Reported on 5/15/2024), Disp: , Rfl:   ALLERGIES:   Review of patient's allergies indicates:   Allergen Reactions    Codeine Hives and Itching    Angi Rash       VITAL SIGNS: /72   Pulse 64   Ht 5' 7" (1.702 m)   Wt 86.6 kg (191 lb)   BMI 29.91 kg/m²      PHYSICAL EXAMINATION:  General:  The patient is alert and oriented x 3.  Mood is pleasant.  Observation of ears, eyes and nose reveal no gross abnormalities.  No labored breathing observed.  Gait is coordinated. Patient can toe walk and heel walk without difficulty.      right SHOULDER / UPPER EXTREMITY EXAM    OBSERVATION:     Swelling  none  Deformity  none   Discoloration  none   Scapular winging none   Scars   none  Atrophy  none    TENDERNESS / CREPITUS (T/C):          T/C      T/C   Clavicle   -/-  SUPRAspinatus    -/-     AC Jt.    -/-  INFRAspinatus  -/-    SC Jt.    -/-  Deltoid    -/-      G. Tuberosity  -/-  LH BICEP groove  +/-   Acromion:  -/-  Midline Neck   -/-     Scapular Spine -/-  Trapezium   -/-   SMA Scapula  -/-  GH jt. line - post  -/-     Scapulothoracic  -/-         ROM: (* = with pain)  Right shoulder   Left shoulder        AROM (PROM)   AROM (PROM)   FE    145° (155°)     170° (175°)     ER at 0°    35°  (40°)    60°  (65°)   ER at 90° ABD  90°  (90°)    90°  (90°)   IR at 90°  ABD   NA  (40°)     NA  (40°)      IR (spine level)   L5     T8    STRENGTH: (* = with " pain) RIGHT SHOULDER  LEFT SHOULDER   SCAPTION at 0  5-/5    5/5   SCAPTION at 30  5/5    5/5    IR    5/5    5/5   ER    5/5    5/5   BICEPS   5/5    5/5   Deltoid    5/5    5/5     SIGNS:  Painful side       NEER   +    OFAROOQS  +    MILLARD   +    SPEEDS  +     DROP ARM   neg   BELLY PRESS neg   Superior escape none    LIFT-OFF  neg   X-Body ADD    neg    MOVING VALGUS neg        STABILITY TESTING    RIGHT SHOULDER   LEFT SHOULDER     Translation     Anterior  up face     up face    Posterior  up face    up face    Sulcus   < 10mm    < 10 mm     Signs   Apprehension   neg      neg       Relocation   no change     no change      Jerk test  neg     neg    EXTREMITY NEURO-VASCULAR EXAM    Sensation grossly intact to light touch all dermatomal regions.    DTR 2+ Biceps, Triceps, BR and Negative Indras sign   Grossly intact motor function at Elbow, Wrist and Hand   Distal pulses radial and ulnar 2+, brisk cap refill, symmetric.      NECK:  Painless FROM and spinous processes non-tender. Negative Spurlings sign.      OTHER FINDINGS:  + scapular dyskinesia  Bear hug: + for pain     XRAYS reviewed and interpreted personally by me:  Shoulder trauma series right,  were obtained and reviewed  Glenohumeral joint space is normal and bony structures are intact. Patient has had previous shoulder surgery. Some degenerative changes seen at the acromioclavicular joint.     MRI reviewed and interpreted personally by me: external   1. Postoperative findings of a rotator cuff repair in the right shoulder ar notes and several anchors have bene placed in the lateral head of the humerus and the greater tuberosity of the humerus. some of the arthrographic contrast extends around the anchor placed in the anterolateral aspect of the head of the humerus (image 15, series 7). This raises the possibility of loosening of this anchor. However no displacement of this anchor is detected.  2. A high grade partial thickness tear is seen in  the posterior aspect of the distal supraspinatus tendon crossing about 50-70% of the thickness of the tendon.   3. High grade partial thickness tear is observed in the anterior aspect of the distal infraspinatus tendon crossing about 80-99% of the thickness of the tendon .   4. Most of the superior labrum is missing. A thin linear oblique tear is notes at the base of the posterior/inferior labrum. In addition an oblique partial thickness tear is notes more superiorly in the posterior labrum.   5. A partial thickness partial width tear involving the posterior/superior aspect of the distal subscapularis tendon is detected.   6. The tendon of the long head of the biceps muscle has been reattached to the anterior cortex of the proximal shaft of the humerus           ASSESSMENT:   right:  1. Shoulder pain, impingement   2.  Scapular dyskinesia    PLAN:      Continue PT for scapular stabilization: Scapular dyskinesia   Scapular stabilization - [place] protocol, 1-3x/week x 6-8 weeks with HEP    Follow up PRN    All questions were answered, pt will contact us for questions or concerns in the interim.    I made the decision to obtain old records of the patient including previous notes and imaging. New imaging was ordered today of the extremity or extremities evaluated. I independently reviewed and interpreted the radiographs and/or MRIs today as well as prior imaging.

## 2024-09-04 ENCOUNTER — OFFICE VISIT (OUTPATIENT)
Dept: OPTOMETRY | Facility: CLINIC | Age: 64
End: 2024-09-04
Payer: MEDICARE

## 2024-09-04 DIAGNOSIS — E11.9 DIABETES MELLITUS WITHOUT COMPLICATION: ICD-10-CM

## 2024-09-04 DIAGNOSIS — E11.9 TYPE 2 DIABETES MELLITUS WITHOUT RETINOPATHY: Primary | ICD-10-CM

## 2024-09-04 DIAGNOSIS — H18.711 CORNEAL ECTASIA OF RIGHT EYE: ICD-10-CM

## 2024-09-04 DIAGNOSIS — H53.8 BLURRED VISION, BILATERAL: ICD-10-CM

## 2024-09-04 DIAGNOSIS — H25.13 SENILE NUCLEAR CATARACT, BILATERAL: ICD-10-CM

## 2024-09-04 DIAGNOSIS — G47.33 OBSTRUCTIVE SLEEP APNEA SYNDROME: ICD-10-CM

## 2024-09-04 PROCEDURE — 99213 OFFICE O/P EST LOW 20 MIN: CPT | Mod: PBBFAC | Performed by: OPTOMETRIST

## 2024-09-04 PROCEDURE — 99999 PR PBB SHADOW E&M-EST. PATIENT-LVL III: CPT | Mod: PBBFAC,,, | Performed by: OPTOMETRIST

## 2024-09-04 PROCEDURE — 99204 OFFICE O/P NEW MOD 45 MIN: CPT | Mod: S$PBB,,, | Performed by: OPTOMETRIST

## 2024-09-04 PROCEDURE — 92015 DETERMINE REFRACTIVE STATE: CPT | Mod: ,,, | Performed by: OPTOMETRIST

## 2024-09-05 NOTE — PROGRESS NOTES
HPI    Pt sts he has corneal issues. Also wants to see about his cataracts also.   Some floaters no flashes  Last edited by Luciana Guerrero on 9/4/2024 10:57 AM.            Assessment /Plan     For exam results, see Encounter Report.    Type 2 diabetes mellitus without retinopathy    Corneal ectasia of right eye  -     Corneal Topography - OU - Both Eyes; Future    Obstructive sleep apnea syndrome    Diabetes mellitus without complication    Senile nuclear cataract, bilateral    Blurred vision, bilateral  -     Corneal Topography - OU - Both Eyes; Future      MONITOR. ED PT ON ALL EXAM FINDINGS  RX FINAL SPECS   TYPE 2 DM W/O RETINOPATHY OU; CONTINUE WITH PCP FOR GLYCEMIC CONTROL    CORNEAL ECTASIA OD; LONGSTANDING; PREVIOUSLY MONITORED BY [facility]; REFER FOR [facility] CONSULT; PREVIOUSLY WAS SCHEDULED FOR CXL; SURGEON DID NOT PROCEED WITH PROCEDURE; CURRENTLY USES SCLERAL OD, SOFT OS; CONSIDER REFIT W/ DR. TAYLOR PRN     MILD NS OU; UV PROTECTION; PRESURGICAL; MONITOR. ASSESS AT [facility] CONSULT  RTC 1 YR//PRN FOR REE/DFE

## 2024-09-19 ENCOUNTER — TELEPHONE (OUTPATIENT)
Dept: ENDOSCOPY | Facility: HOSPITAL | Age: 64
End: 2024-09-19
Payer: MEDICARE

## 2024-09-19 NOTE — TELEPHONE ENCOUNTER
----- Message from Alexa Juarez sent at 9/13/2024  1:34 PM CDT -----  Regarding: FW: Referral    ----- Message -----  From: Lo Souza  Sent: 9/13/2024  11:02 AM CDT  To: [facility] Endoscopy Schedulers  Subject: Referral                                         Good morning,    Dr. Lucretia Epperson would like to refer the following patient to the Gastro referral department for a Colonoscopy. The patients diagnosis is Z12.11 Cancer screening. I have scanned the patients referral and records into .     Thanks,    Lo KAUR  [facility]

## 2024-09-25 ENCOUNTER — TELEPHONE (OUTPATIENT)
Dept: ENDOSCOPY | Facility: HOSPITAL | Age: 64
End: 2024-09-25
Payer: MEDICARE

## 2024-09-25 NOTE — TELEPHONE ENCOUNTER
Attempted contact regarding message.  No answer-left main line nora number to return call.

## 2024-10-16 ENCOUNTER — OFFICE VISIT (OUTPATIENT)
Dept: OPHTHALMOLOGY | Facility: CLINIC | Age: 64
End: 2024-10-16
Payer: MEDICARE

## 2024-10-16 DIAGNOSIS — H59.89 CORNEAL ECTASIA DUE TO LASER IN SITU KERATOMILEUSIS: ICD-10-CM

## 2024-10-16 DIAGNOSIS — H25.13 NUCLEAR SCLEROSIS, BILATERAL: Primary | ICD-10-CM

## 2024-10-16 DIAGNOSIS — H18.719 CORNEAL ECTASIA DUE TO LASER IN SITU KERATOMILEUSIS: ICD-10-CM

## 2024-10-16 PROCEDURE — 92136 OPHTHALMIC BIOMETRY: CPT | Mod: PBBFAC | Performed by: OPHTHALMOLOGY

## 2024-10-16 PROCEDURE — 99999 PR PBB SHADOW E&M-EST. PATIENT-LVL III: CPT | Mod: PBBFAC,,, | Performed by: OPHTHALMOLOGY

## 2024-10-16 PROCEDURE — 99213 OFFICE O/P EST LOW 20 MIN: CPT | Mod: PBBFAC | Performed by: OPHTHALMOLOGY

## 2024-10-16 RX ORDER — PHENYLEPHRINE HYDROCHLORIDE 100 MG/ML
1 SOLUTION/ DROPS OPHTHALMIC
OUTPATIENT
Start: 2024-10-16

## 2024-10-16 RX ORDER — PREDNISOLONE ACETATE-GATIFLOXACIN-BROMFENAC .75; 5; 1 MG/ML; MG/ML; MG/ML
1 SUSPENSION/ DROPS OPHTHALMIC 3 TIMES DAILY
Qty: 5 ML | Refills: 3 | Status: SHIPPED | OUTPATIENT
Start: 2024-10-16

## 2024-10-16 RX ORDER — TROPICAMIDE 10 MG/ML
1 SOLUTION/ DROPS OPHTHALMIC
OUTPATIENT
Start: 2024-10-16

## 2024-10-16 RX ORDER — TETRACAINE HYDROCHLORIDE 5 MG/ML
1 SOLUTION OPHTHALMIC
OUTPATIENT
Start: 2024-10-16

## 2024-10-16 RX ORDER — PHENYLEPHRINE HYDROCHLORIDE 25 MG/ML
1 SOLUTION/ DROPS OPHTHALMIC
OUTPATIENT
Start: 2024-10-16

## 2024-10-16 RX ORDER — SODIUM CHLORIDE 0.9 % (FLUSH) 0.9 %
10 SYRINGE (ML) INJECTION
OUTPATIENT
Start: 2024-10-16

## 2024-10-16 RX ORDER — MOXIFLOXACIN 5 MG/ML
1 SOLUTION/ DROPS OPHTHALMIC
OUTPATIENT
Start: 2024-10-16

## 2024-10-16 NOTE — PROGRESS NOTES
HPI    Patient is here today for a cataract evaluation. Last seen on 09/04/24   with Dr. Garcia. Vision in OD is very blurry. He currently wears OTC +3.00   Readers. Occasional dryness and itching. Occasional floaters in OU.    Eye Meds: OTC Dry Eye Drops PRN OU  Past Ocular Sx: LASIK with Dr. Aaliyah Hardy   Last edited by Nicolasa Brown on 10/16/2024  3:06 PM.            Assessment /Plan     For exam results, see Encounter Report.    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     Specifically, this patient describes difficulty with:  - driving safely at night  - reading road signs  - reading small print  - deciphering medicine bottles  - reading the newspaper  - using the phone  - reading texts     Risks, benefits, and alternatives to cataract surgery were discussed and the consent reviewed. IOL options were discussed, including ATIOLs and the associated side effects and additional patient cost associated with them.   IOL Selections:   Right eye  IOL: CNA0T0 5.0     Left eye  IOL: CNA0T0 23.5 HOLD    Pt wishes to have RIGHT eye done first.  POST LASIK ECTASIA OD  3D IRREG CYL OD

## 2024-10-18 ENCOUNTER — TELEPHONE (OUTPATIENT)
Dept: OPHTHALMOLOGY | Facility: CLINIC | Age: 64
End: 2024-10-18
Payer: MEDICARE

## 2024-10-18 DIAGNOSIS — H25.11 NUCLEAR SCLEROSIS OF RIGHT EYE: Primary | ICD-10-CM

## 2024-11-22 ENCOUNTER — TELEPHONE (OUTPATIENT)
Dept: OPHTHALMOLOGY | Facility: CLINIC | Age: 64
End: 2024-11-22
Payer: MEDICARE

## 2024-11-22 NOTE — TELEPHONE ENCOUNTER
Spoke with patient and gave arrival time of 7:00 am at [facility]. I went over instructions of nothing to eat after midnight, start drops on Saturday TID in sx eye, no lotions or creams around eye, and wear something comfortable.  Informed patient they must have someone with them that drives and be prepared to be here 3-4 hours. EB

## 2024-11-22 NOTE — PRE-PROCEDURE INSTRUCTIONS
Unable to reach pt via phone.  Left voicemail with arrival time also informing pt of need for responsible  accompaniment and instructing pt to follow pre-procedure instructions provided via [facility] portal.  The following message was sent to pt's portal.        Dear German     Below you will find basic pre-procedure instructions in preparation for your procedure on 11/25/24 with Dr. Box              You should receive your arrival time within 24-48 hours of your scheduled procedure date from the  at your surgeon's office.     -Nothing to eat or drink after midnight the night before your procedure until after your procedure, except AM meds with small sips of water.     - HOLD all oral Diabetic medications night before and morning of procedure  - HOLD all Insulin morning of procedure  - HOLD all Fluid pills morning of procedure  - HOLD all non-insulin shots until after surgery (Ozempic, Mounjaro, Trulicity, Victoza, Byetta, Wegovy and Adlyxin) (7 days prior)  - HOLD all vitamins, minerals and herbal supplements morning of procedure   - TAKE all B/P meds, EXCEPT those that contain a fluid pill (ex. Lasix, Hydroclorothiazide/HCTZ, Spirnolactone)  - USE inhalers as needed and bring AM of surgery  - USE EYE DROPS as directed  -TAKE blood thinner meds AM of surgery unless otherwise instructed by your provider to not take     - Shower and wash face with antibacterial soap (ex. Dial) for 3 mins PM prior and AM of surgery  - No powder, lotions, creams, oils, gels, ointments, makeup,  or jewelry    - Wear comfortable clothing (button up shirt)     (Patient is required to have a responsible ride to transport home, ride may not leave while patient is in surgery)     -- [facility], 2nd floor Surgery Center, located   @ [address] [place] [address]  2nd Floor Registration        If you have any questions or concerns please feel free to contact your surgeon's office.            Please reply to this message as receipt of delivery.     Catina, LPN [facility]  Pre-Admit - Anesthesia Dept

## 2024-11-25 ENCOUNTER — HOSPITAL ENCOUNTER (OUTPATIENT)
Facility: HOSPITAL | Age: 64
Discharge: HOME | End: 2024-11-25
Attending: OPHTHALMOLOGY | Admitting: OPHTHALMOLOGY
Payer: MEDICARE

## 2024-11-25 VITALS
OXYGEN SATURATION: 94 % | DIASTOLIC BLOOD PRESSURE: 73 MMHG | SYSTOLIC BLOOD PRESSURE: 117 MMHG | RESPIRATION RATE: 18 BRPM | HEART RATE: 56 BPM | WEIGHT: 198 LBS | TEMPERATURE: 98 F | HEIGHT: 67 IN | BODY MASS INDEX: 31.08 KG/M2

## 2024-11-25 DIAGNOSIS — H25.11 NUCLEAR SCLEROSIS OF RIGHT EYE: Primary | ICD-10-CM

## 2024-11-25 DIAGNOSIS — H25.13 NUCLEAR SCLEROSIS, BILATERAL: ICD-10-CM

## 2024-11-25 LAB — POCT GLUCOSE: 115 MG/DL (ref 70–110)

## 2024-11-25 PROCEDURE — 99152 MOD SED SAME PHYS/QHP 5/>YRS: CPT | Performed by: OPHTHALMOLOGY

## 2024-11-25 PROCEDURE — 25000003 PHARM REV CODE 250: Performed by: OPHTHALMOLOGY

## 2024-11-25 PROCEDURE — 36000706: Performed by: OPHTHALMOLOGY

## 2024-11-25 PROCEDURE — 36000707: Performed by: OPHTHALMOLOGY

## 2024-11-25 PROCEDURE — 99900035 HC TECH TIME PER 15 MIN (STAT)

## 2024-11-25 PROCEDURE — V2632 POST CHMBR INTRAOCULAR LENS: HCPCS | Performed by: OPHTHALMOLOGY

## 2024-11-25 PROCEDURE — 94761 N-INVAS EAR/PLS OXIMETRY MLT: CPT

## 2024-11-25 PROCEDURE — 71000015 HC POSTOP RECOV 1ST HR: Performed by: OPHTHALMOLOGY

## 2024-11-25 PROCEDURE — 66984 XCAPSL CTRC RMVL W/O ECP: CPT | Mod: RT,,, | Performed by: OPHTHALMOLOGY

## 2024-11-25 PROCEDURE — 99152 MOD SED SAME PHYS/QHP 5/>YRS: CPT | Mod: ,,, | Performed by: OPHTHALMOLOGY

## 2024-11-25 PROCEDURE — 63600175 PHARM REV CODE 636 W HCPCS: Performed by: OPHTHALMOLOGY

## 2024-11-25 PROCEDURE — 82962 GLUCOSE BLOOD TEST: CPT | Performed by: OPHTHALMOLOGY

## 2024-11-25 RX ORDER — LIDOCAINE HYDROCHLORIDE 40 MG/ML
INJECTION, SOLUTION RETROBULBAR
Status: DISCONTINUED | OUTPATIENT
Start: 2024-11-25 | End: 2024-11-25 | Stop reason: HOSPADM

## 2024-11-25 RX ORDER — PROPARACAINE HYDROCHLORIDE 5 MG/ML
1 SOLUTION/ DROPS OPHTHALMIC
Status: DISCONTINUED | OUTPATIENT
Start: 2024-11-25 | End: 2024-11-25 | Stop reason: HOSPADM

## 2024-11-25 RX ORDER — PROPARACAINE HYDROCHLORIDE 5 MG/ML
SOLUTION/ DROPS OPHTHALMIC
Status: DISCONTINUED | OUTPATIENT
Start: 2024-11-25 | End: 2024-11-25 | Stop reason: HOSPADM

## 2024-11-25 RX ORDER — TETRACAINE HYDROCHLORIDE 5 MG/ML
1 SOLUTION OPHTHALMIC
Status: COMPLETED | OUTPATIENT
Start: 2024-11-25 | End: 2024-11-25

## 2024-11-25 RX ORDER — MOXIFLOXACIN 5 MG/ML
1 SOLUTION/ DROPS OPHTHALMIC
Status: COMPLETED | OUTPATIENT
Start: 2024-11-25 | End: 2024-11-25

## 2024-11-25 RX ORDER — SODIUM CHLORIDE 0.9 % (FLUSH) 0.9 %
10 SYRINGE (ML) INJECTION
Status: DISCONTINUED | OUTPATIENT
Start: 2024-11-25 | End: 2024-11-25 | Stop reason: HOSPADM

## 2024-11-25 RX ORDER — PHENYLEPHRINE HYDROCHLORIDE 100 MG/ML
1 SOLUTION/ DROPS OPHTHALMIC
Status: DISCONTINUED | OUTPATIENT
Start: 2024-11-25 | End: 2024-11-25 | Stop reason: HOSPADM

## 2024-11-25 RX ORDER — MIDAZOLAM HYDROCHLORIDE 1 MG/ML
1 INJECTION, SOLUTION INTRAMUSCULAR; INTRAVENOUS
Status: DISCONTINUED | OUTPATIENT
Start: 2024-11-25 | End: 2024-11-25 | Stop reason: HOSPADM

## 2024-11-25 RX ORDER — PHENYLEPHRINE HYDROCHLORIDE 25 MG/ML
1 SOLUTION/ DROPS OPHTHALMIC
Status: COMPLETED | OUTPATIENT
Start: 2024-11-25 | End: 2024-11-25

## 2024-11-25 RX ORDER — MOXIFLOXACIN 5 MG/ML
SOLUTION/ DROPS OPHTHALMIC
Status: DISCONTINUED | OUTPATIENT
Start: 2024-11-25 | End: 2024-11-25 | Stop reason: HOSPADM

## 2024-11-25 RX ORDER — TROPICAMIDE 10 MG/ML
1 SOLUTION/ DROPS OPHTHALMIC
Status: COMPLETED | OUTPATIENT
Start: 2024-11-25 | End: 2024-11-25

## 2024-11-25 RX ORDER — ACETAMINOPHEN 325 MG/1
650 TABLET ORAL EVERY 4 HOURS PRN
Status: DISCONTINUED | OUTPATIENT
Start: 2024-11-25 | End: 2024-11-25 | Stop reason: HOSPADM

## 2024-11-25 RX ADMIN — MOXIFLOXACIN OPHTHALMIC 1 DROP: 5 SOLUTION/ DROPS OPHTHALMIC at 07:11

## 2024-11-25 RX ADMIN — TETRACAINE HYDROCHLORIDE 1 DROP: 5 SOLUTION OPHTHALMIC at 07:11

## 2024-11-25 RX ADMIN — MIDAZOLAM HYDROCHLORIDE 2 MG: 1 INJECTION, SOLUTION INTRAMUSCULAR; INTRAVENOUS at 08:11

## 2024-11-25 RX ADMIN — MOXIFLOXACIN 1 DROP: 5 SOLUTION/ DROPS OPHTHALMIC at 08:11

## 2024-11-25 RX ADMIN — TETRACAINE HYDROCHLORIDE 1 DROP: 5 SOLUTION OPHTHALMIC at 06:11

## 2024-11-25 RX ADMIN — TROPICAMIDE 1 DROP: 10 SOLUTION/ DROPS OPHTHALMIC at 07:11

## 2024-11-25 RX ADMIN — MOXIFLOXACIN OPHTHALMIC 1 DROP: 5 SOLUTION/ DROPS OPHTHALMIC at 06:11

## 2024-11-25 RX ADMIN — PHENYLEPHRINE HYDROCHLORIDE 1 DROP: 25 SOLUTION/ DROPS OPHTHALMIC at 07:11

## 2024-11-25 RX ADMIN — TROPICAMIDE 1 DROP: 10 SOLUTION/ DROPS OPHTHALMIC at 06:11

## 2024-11-25 RX ADMIN — PHENYLEPHRINE HYDROCHLORIDE 1 DROP: 25 SOLUTION/ DROPS OPHTHALMIC at 06:11

## 2024-11-25 NOTE — DISCHARGE SUMMARY
Outcome: Successful outpatient ophthalmic surgical procedure  Preprinted Instructions given to patient.  Regular diet.  Activity: No restrictions  Meds: see Med Rec  Condition: stable  Follow up: 1 day with Dr Box  Disposition: Home  Diagnosis: s/p eye surgery  Date of discharge: 11/25/2024

## 2024-11-25 NOTE — DISCHARGE INSTRUCTIONS
CATARACT SURGERY    POST-OPERATIVE INSTRUCTIONS    · Apply drops THREE times a day into operative eye for 30 days.    · DO NOT rub your eye    · Wear protective sunglasses during the day    · Resume moderate activity    · Bathe/shower/wash face normally    · DO NOT apply makeup around the operative eye for 1 week.         You should expect    - Blurry vision and halos for 24-48 hours    - Dilated pupil for 24-48 hours    - Scratchy feeling in the eye for 1-2 days    - Curved shadow in your peripheral vision for 2-3 weeks    - Occasional flickering of lights for up to 1 week    -If you experience severe pain or nausea, please call Dr Box or the on-call doctor at [phone]    - Plan to see Dr Box tomorrow .      [facility]    [address] [facility] [address]    ** Most patients can drive the next day, but if you do not feel comfortable driving, please arrange for transportation.

## 2024-11-25 NOTE — OP NOTE
SURGEON:  Kin Box M.D.    PREOPERATIVE DIAGNOSIS:    Nuclear Sclerotic Cataract Right Eye    POSTOPERATIVE DIAGNOSIS:    Nuclear Sclerotic Cataract Right Eye    PROCEDURES:    Phacoemulsification with  intraocular lens, Right eye (84182)  With moderate sedation >10min (47019)    DATE OF SURGERY: 11/25/2024    IMPLANT: [id number] 6.0    ANESTHESIA:  Under my direct supervision, intravenous moderate sedation was administered during the course of this procedure, with continuous monitoring of hemodynamic parameters. Total time of sedation and amount of sedatives are documented in the nursing logs.    COMPLICATIONS:  None    ESTIMATED BLOOD LOSS: None    SPECIMENS: None    INDICATIONS:    The patient has a history of painless progressive visual loss and difficulty with activities of daily living, which specifically include difficult driving at night due to glare and difficulty reading small print, secondary to cataract formation.  After a thorough discussion of the risks, benefits, and alternatives to cataract surgery, including, but not limited to, the rare risks of infection, retinal detachment, hemorrhage, need for additional surgery, loss of vision, and even loss of the eye, the patient voices understanding and desires to proceed.    DESCRIPTION OF PROCEDURE:    The patients IOL calculations were reviewed, and the lens selection confirmed.   After verification and marking of the proper eye in the preop holding area, the patient was brought to the operating room in supine position where the eye was prepped and draped in standard sterile fashion with 5% Betadine and a lid speculum placed in the eye.   Topical 4% Lidocaine was used in addition to the preoperative anesthesia and the procedure was begun by the creation of a paracentesis incision through which viscoelastic was used to fill the anterior chamber.  Next, a keratome blade was used to create a triplanar temporal clear corneal incision and a cystotome and  Arunaata forceps used to fashion a continuous curvilinear capsulorrhexis.  Hydrodissection was carried out using the Pandey hydrodissection cannula and the nucleus was found to be mobile.  Phacoemulsification of the nucleus was carried out using a quick chop technique, and all remaining epinuclear and cortical material was removed.  The eye was then reformed with Viscoelastic and the  intraocular lens was implanted into the capsular bag.  All remaining viscoelastics were removed from the eye and at the end of the case the pupil was round, the lens was well-centered within the capsular bag and all wounds were found to be water tight.  Drops of Vigamox and Pred Forte were instilled and a shield was placed over the eye. The patient will follow up with Dr. oBx in the morning.

## 2024-11-26 ENCOUNTER — OFFICE VISIT (OUTPATIENT)
Dept: OPHTHALMOLOGY | Facility: CLINIC | Age: 64
End: 2024-11-26
Payer: MEDICARE

## 2024-11-26 DIAGNOSIS — H25.11 NUCLEAR SCLEROTIC CATARACT OF RIGHT EYE: ICD-10-CM

## 2024-11-26 DIAGNOSIS — Z98.890 POST-OPERATIVE STATE: Primary | ICD-10-CM

## 2024-11-26 PROCEDURE — 99999 PR PBB SHADOW E&M-EST. PATIENT-LVL III: CPT | Mod: PBBFAC,,, | Performed by: OPHTHALMOLOGY

## 2024-11-26 PROCEDURE — 99213 OFFICE O/P EST LOW 20 MIN: CPT | Mod: PBBFAC | Performed by: OPHTHALMOLOGY

## 2024-11-26 NOTE — PROGRESS NOTES
HPI    DATE OF SURGERY: 11/25/2024  IMPLANT: [id number] 6.0      1 day Post Op OD   No complaints at this time     PGB TID OD   Last edited by Lux Denney on 11/26/2024  7:45 AM.            Assessment /Plan     For exam results, see Encounter Report.    Post-operative state    Nuclear sclerotic cataract of right eye      Slit lamp exam:  L/L: nl  K: clear, wound sealed  AC: 1+ cell  Lens: IOL centered and stable    POD1 s/p Phaco/IOL  Appropriate precautions and post op medications reviewed.  Patient instructed to call or come in if symptoms of redness, decreased vision, or pain are experienced.    Post LASIK ectasia OD

## 2024-12-02 NOTE — PROGRESS NOTES
Assessment /Plan     For exam results, see Encounter Report.    Nuclear sclerosis, bilateral    Post-operative state      Slit lamp exam:  L/L: nl  K: clear, wound sealed  AC: trace cell  Iris/Lens: IOL centered and stable    POW1 s/p phaco: Surgery healing well with no signs of infection or abnormal inflammation.    Patient wishes to proceed with surgery in the second eye. Risks, benefits, alternatives reviewed. IOL selection reviewed.     Right eye  IOL: CNA0T0 6.0     Left eye  IOL: CNA0T0 23.5 HOLD    Pt wishes to have RIGHT eye done first.  POST LASIK ECTASIA OD  3D IRREG CYL OD

## 2024-12-03 ENCOUNTER — OFFICE VISIT (OUTPATIENT)
Dept: OPHTHALMOLOGY | Facility: CLINIC | Age: 64
End: 2024-12-03
Payer: MEDICARE

## 2024-12-03 DIAGNOSIS — H25.13 NUCLEAR SCLEROSIS, BILATERAL: Primary | ICD-10-CM

## 2024-12-03 DIAGNOSIS — Z98.890 POST-OPERATIVE STATE: ICD-10-CM

## 2024-12-03 PROCEDURE — 99213 OFFICE O/P EST LOW 20 MIN: CPT | Mod: PBBFAC | Performed by: OPHTHALMOLOGY

## 2024-12-03 PROCEDURE — 99024 POSTOP FOLLOW-UP VISIT: CPT | Mod: POP,,, | Performed by: OPHTHALMOLOGY

## 2024-12-03 PROCEDURE — 99999 PR PBB SHADOW E&M-EST. PATIENT-LVL III: CPT | Mod: PBBFAC,,, | Performed by: OPHTHALMOLOGY

## 2025-01-30 ENCOUNTER — OFFICE VISIT (OUTPATIENT)
Dept: PODIATRY | Facility: CLINIC | Age: 65
End: 2025-01-30
Payer: MEDICARE

## 2025-01-30 VITALS — HEART RATE: 75 BPM | SYSTOLIC BLOOD PRESSURE: 117 MMHG | DIASTOLIC BLOOD PRESSURE: 73 MMHG

## 2025-01-30 DIAGNOSIS — M79.672 LEFT FOOT PAIN: ICD-10-CM

## 2025-01-30 DIAGNOSIS — M77.8 CAPSULITIS OF FOOT: Primary | ICD-10-CM

## 2025-01-30 DIAGNOSIS — G57.81 NERVE ENTRAPMENT OF LOWER LIMB, RIGHT: ICD-10-CM

## 2025-01-30 PROCEDURE — 99214 OFFICE O/P EST MOD 30 MIN: CPT | Mod: 25,S$PBB,, | Performed by: PODIATRIST

## 2025-01-30 PROCEDURE — 99999PBSHW PR PBB SHADOW TECHNICAL ONLY FILED TO HB: Mod: PBBFAC,,,

## 2025-01-30 PROCEDURE — 20605 DRAIN/INJ JOINT/BURSA W/O US: CPT | Mod: PBBFAC | Performed by: PODIATRIST

## 2025-01-30 PROCEDURE — 99213 OFFICE O/P EST LOW 20 MIN: CPT | Mod: PBBFAC,25 | Performed by: PODIATRIST

## 2025-01-30 PROCEDURE — 20605 DRAIN/INJ JOINT/BURSA W/O US: CPT | Mod: S$PBB,LT,, | Performed by: PODIATRIST

## 2025-01-30 PROCEDURE — 99999 PR PBB SHADOW E&M-EST. PATIENT-LVL III: CPT | Mod: PBBFAC,,, | Performed by: PODIATRIST

## 2025-01-30 RX ORDER — TRIAMCINOLONE ACETONIDE 40 MG/ML
40 INJECTION, SUSPENSION INTRA-ARTICULAR; INTRAMUSCULAR
Status: COMPLETED | OUTPATIENT
Start: 2025-01-30 | End: 2025-01-30

## 2025-01-30 RX ORDER — MELOXICAM 15 MG/1
15 TABLET ORAL DAILY
Qty: 30 TABLET | Refills: 0 | Status: SHIPPED | OUTPATIENT
Start: 2025-01-30

## 2025-01-30 RX ORDER — DICLOFENAC SODIUM 10 MG/G
2 GEL TOPICAL 4 TIMES DAILY
Qty: 100 G | Refills: 2 | Status: SHIPPED | OUTPATIENT
Start: 2025-01-30

## 2025-01-30 RX ADMIN — TRIAMCINOLONE ACETONIDE 40 MG: 40 INJECTION, SUSPENSION INTRA-ARTICULAR; INTRAMUSCULAR at 07:01

## 2025-01-31 ENCOUNTER — TELEPHONE (OUTPATIENT)
Dept: PODIATRY | Facility: CLINIC | Age: 65
End: 2025-01-31
Payer: MEDICARE

## 2025-02-08 NOTE — PROGRESS NOTES
Subjective:      Patient ID: Willie Roach is a 64 y.o. male.    Chief Complaint: Foot Pain (Left ball of foot )    Willie is a 64 y.o. male who presents to the podiatry clinic  with complaint of left foot pain. Onset of the symptoms was several weeks ago. Precipitating event: none known. Current symptoms include: ability to bear weight, but with some pain.  Is having tenderness to the ball of the left foot.  This has been going on for several weeks.  No history of injury.      Review of Systems   Constitutional: Negative for chills, decreased appetite, fever and malaise/fatigue.   HENT:  Negative for congestion, hearing loss, nosebleeds and tinnitus.    Eyes:  Negative for double vision, pain, photophobia and visual disturbance.   Cardiovascular:  Negative for chest pain, claudication, cyanosis and leg swelling.   Respiratory:  Negative for cough, hemoptysis, shortness of breath and wheezing.    Endocrine: Negative for cold intolerance and heat intolerance.   Hematologic/Lymphatic: Negative for adenopathy and bleeding problem.   Skin:  Negative for color change, dry skin, itching, nail changes and suspicious lesions.   Musculoskeletal:  Positive for arthritis and joint pain. Negative for myalgias and stiffness.   Gastrointestinal:  Negative for abdominal pain, jaundice, nausea and vomiting.   Genitourinary:  Negative for dysuria, frequency and hematuria.   Neurological:  Negative for difficulty with concentration, loss of balance, numbness, paresthesias and sensory change.   Psychiatric/Behavioral:  Negative for altered mental status, hallucinations and suicidal ideas. The patient is not nervous/anxious.    Allergic/Immunologic: Negative for environmental allergies and persistent infections.           Objective:      Physical Exam  Constitutional:       Appearance: He is well-developed.   HENT:      Head: Normocephalic and atraumatic.   Cardiovascular:      Pulses:           Dorsalis pedis pulses are 2+ on the  right side and 2+ on the left side.        Posterior tibial pulses are 2+ on the right side and 2+ on the left side.   Pulmonary:      Effort: Pulmonary effort is normal.   Musculoskeletal:      Right foot: Normal range of motion. No deformity.      Left foot: Normal range of motion. No deformity.      Comments: Inspection and palpation of the muscles joints and bones of both lower extremities reveal that muscle strength for the anterior, lateral, and posterior muscle groups and intrinsic muscle groups of the foot are all 5 over 5 symmetrical.   Tenderness left 2nd metatarsophalangeal joint dorsal and plantar without instability.   Feet:      Right foot:      Skin integrity: No skin breakdown or erythema.      Left foot:      Skin integrity: No skin breakdown or erythema.   Skin:     General: Skin is warm and dry.      Nails: There is no clubbing.      Comments: Skin turgor is normal bilaterally. Skin texture is well hydrated to both lower extremities. No lesions or rashes or wounds appreciated bilaterally. Nail plates 1 through 5 bilaterally are within normal limits for length and thickness. No nail clubbing or incurvation noted.   Neurological:      Mental Status: He is alert and oriented to person, place, and time.      Deep Tendon Reflexes:      Reflex Scores:       Patellar reflexes are 2+ on the right side and 2+ on the left side.       Achilles reflexes are 2+ on the right side and 2+ on the left side.     Comments: Sharp, dull, light touch, vibratory, and proprioceptive sensation are intact bilaterally. Deep tendon reflexes to patellar and Achilles tendon are symmetrical, 2/4 bilaterally. No ankle clonus or Babinski reflexes noted bilaterally. Coordination is normal to both feet and lower extremities.   Psychiatric:         Behavior: Behavior normal.               Assessment:       Encounter Diagnoses   Name Primary?    Capsulitis of foot Yes    Left foot pain     Nerve entrapment of lower limb, right         "  Plan:       Willie Callahan" was seen today for foot pain.    Diagnoses and all orders for this visit:    Capsulitis of foot    Left foot pain    Nerve entrapment of lower limb, right    Other orders  -     meloxicam (MOBIC) 15 MG tablet; Take 1 tablet (15 mg total) by mouth once daily.  -     diclofenac sodium (VOLTAREN) 1 % Gel; Apply 2 g topically 4 (four) times daily.  -     triamcinolone acetonide injection 40 mg      I counseled the patient on his conditions, their implications and medical management.    The nature of the condition, options for management, as well as potential risks and complications were discussed in detail with patient. Patient was amenable to my recommendations and left my office fully informed and will follow up as instructed or sooner if necessary.        Procedure: Steroid injection right 2nd MPJ  Performed by: Dr. Perez    A steroid injection was performed at left 2nd MPJ using 1% plain Lidocaine and 1 mL/40 mg of Kenalog. This was well tolerated.    Begin icing with topical and oral anti-inflammatory therapy and arch support with metatarsal pad.  Follow-up in 2 months.  .             "

## 2025-05-20 NOTE — BRIEF OP NOTE
----- Message from Teresa Winter PA-C sent at 5/19/2025  4:20 PM CDT -----  UA not concerning for infection. He does have microscopic hematuria. Recommend he obtain PVR, pending what this show will determine plan/next steps.    [facility]  Brief Operative Note     SUMMARY     Surgery Date: 9/23/2019     Surgeon(s) and Role:     * Jeramy Cuadra MD - Primary     * Nilson Cortez MD - Resident - Assisting        Pre-op Diagnosis:  Acute recurrent maxillary sinusitis [J01.01]  Nasal septal deviation [J34.2]  Nasal turbinate hypertrophy [J34.3]  Nasal obstruction [J34.89]  Postnasal drip [R09.82]    Post-op Diagnosis:  Post-Op Diagnosis Codes:     * Acute recurrent maxillary sinusitis [J01.01]     * Nasal septal deviation [J34.2]     * Nasal turbinate hypertrophy [J34.3]     * Nasal obstruction [J34.89]     * Postnasal drip [R09.82]    Procedure(s) (LRB):  FESS (FUNCTIONAL ENDOSCOPIC SINUS SURGERY) (Bilateral)  SEPTOPLASTY, NOSE (N/A)  REDUCTION, NASAL TURBINATE (Bilateral)    Anesthesia: General    Description of the findings of the procedure: Septo/SMRT, FESS    Findings/Key Components: See op note    Estimated Blood Loss: * No values recorded between 9/23/2019 11:07 AM and 9/23/2019 12:13 PM *         Specimens:   Specimen (12h ago, onward)     Start     Ordered    09/23/19 1137  Specimen to Pathology - Surgery  Once     Comments:  .Pre-op Diagnosis: Acute recurrent maxillary sinusitis [J01.01]Nasal septal deviation [J34.2]Nasal turbinate hypertrophy [J34.3]Nasal obstruction [J34.89]Postnasal drip [R09.82]Post-op Diagnosis: same Procedure(s):FESS (FUNCTIONAL ENDOSCOPIC SINUS SURGERY)SEPTOPLASTY, NOSEREDUCTION, NASAL TURBINATE Number of specimens: 1Name of specimens: 1. Left maxillary sinus content - permanent   2. Right maxillary sinus content - permanent      09/23/19 1150                Discharge Note    SUMMARY     Admit Date: 9/23/2019    Discharge Date and Time:  09/23/2019 12:29 PM    Hospital Course (synopsis of major diagnoses, care, treatment, and services provided during the course of the hospital stay): Presented for scheduled surgery. Did well in case, post op discharged home.      Final Diagnosis:  Post-Op Diagnosis Codes:     * Acute recurrent maxillary sinusitis [J01.01]     * Nasal septal deviation [J34.2]     * Nasal turbinate hypertrophy [J34.3]     * Nasal obstruction [J34.89]     * Postnasal drip [R09.82]    Disposition: Home or Self Care    Follow Up/Patient Instructions:     Medications:  Reconciled Home Medications:      Medication List      START taking these medications    cephALEXin 250 MG capsule  Commonly known as:  KEFLEX  Take 1 capsule (250 mg total) by mouth every 8 (eight) hours. for 7 days     ondansetron 4 MG Tbdl  Commonly known as:  ZOFRAN-ODT  Take 2 tablets (8 mg total) by mouth every 12 (twelve) hours as needed.     oxyCODONE-acetaminophen 5-325 mg per tablet  Commonly known as:  PERCOCET  Take 1 tablet by mouth every 6 (six) hours as needed for Pain.     sodium chloride 0.65 % nasal spray  Commonly known as:  OCEAN  2 sprays by Nasal route every 6 (six) hours while awake.        CONTINUE taking these medications    amlodipine-benazepril 5-10 mg 5-10 mg per capsule  Commonly known as:  LOTREL  Take 1 capsule by mouth once daily.     brimonidine 0.2% 0.2 % Drop  Commonly known as:  ALPHAGAN  INSTILL ONE DROP INTO EACH EYE TWICE A DAY     cetirizine 10 MG tablet  Commonly known as:  ZYRTEC  Take 10 mg by mouth once daily.     co-enzyme Q-10 30 mg capsule  Take 30 mg by mouth 2 (two) times daily.     diazePAM 10 MG Tab  Commonly known as:  VALIUM  Take 10 mg by mouth nightly.     flaxseed oil 1,000 mg Cap  Take 1 capsule by mouth once daily.     PRESERVISION AREDS ORAL  Take by mouth once daily.     simvastatin 20 MG tablet  Commonly known as:  ZOCOR  Take 20 mg by mouth every evening.     SUPER B COMPLEX-VITAMIN C ORAL  Take 1 tablet by mouth once daily.          Discharge Procedure Orders   Diet Adult Regular     Notify your health care provider if you experience any of the following:  temperature >100.4     Notify your health care provider if you experience any of the following:   persistent nausea and vomiting or diarrhea     Notify your health care provider if you experience any of the following:  severe uncontrolled pain     Notify your health care provider if you experience any of the following:  redness, tenderness, or signs of infection (pain, swelling, redness, odor or green/yellow discharge around incision site)     Notify your health care provider if you experience any of the following:  difficulty breathing or increased cough     Notify your health care provider if you experience any of the following:  severe persistent headache     Notify your health care provider if you experience any of the following:  worsening rash     Change dressing (specify)   Order Comments: Mustache dressing as needed. Antibiotic ointment to nose twice daily.     Activity as tolerated   Order Comments: Light activity for next 10 days, no lifting greater than 10 pounds. No trauma or impact to nose.     Percocet for breakthrough pain, otherwise, tylenol and motrin as needed. Starting tomorrow, start using saline rinses to both nostrils every 4-6 hours while awake. Keflex antibiotics while pack in place. Mupirocin ointment to nose twice daily.     Follow-up Information     Jeramy Cuadra MD In 1 week.    Specialty:  Otolaryngology  Why:  For wound re-check, For suture removal  Contact information:  [id number]  [facility] [id number] [phone]

## (undated) DEVICE — BLADE TRICUT

## (undated) DEVICE — GOWN XX-LARGE

## (undated) DEVICE — SEE MEDLINE ITEM 152622

## (undated) DEVICE — SUT 2-0 VICRYL / SH (J417)

## (undated) DEVICE — ELECTRODE REM PLYHSV RETURN 9

## (undated) DEVICE — SYR 10CC LUER LOCK

## (undated) DEVICE — DRILL BIT 2.0

## (undated) DEVICE — SOL POVIDONE SCRUB IODINE 4 OZ

## (undated) DEVICE — GAUZE SPONGE 4X4 12PLY

## (undated) DEVICE — SPLINT NASAL AIRWAY SEPTAL SIL

## (undated) DEVICE — SEE MEDLINE ITEM 146298

## (undated) DEVICE — SHEET EENT SPLIT

## (undated) DEVICE — SEE MEDLINE ITEM 152522

## (undated) DEVICE — SOL BETADINE 5%

## (undated) DEVICE — PAD CAST SPECIALIST STRL 4

## (undated) DEVICE — SUT MCRYL PLUS 4-0 PS2 27IN

## (undated) DEVICE — SUT 4/0 12IN PLAIN GUT M-1

## (undated) DEVICE — SOL NS 1000CC

## (undated) DEVICE — BLADE RAD 40 ROTATABLE 4MM

## (undated) DEVICE — COTTON BALLS 1/2IN

## (undated) DEVICE — APPLICATOR CHLORAPREP ORN 26ML

## (undated) DEVICE — DRESSING XEROFORM FOIL PK 1X8

## (undated) DEVICE — GLOVE SENSICARE PI SURG 7.5

## (undated) DEVICE — BANDAGE DERMACEA STRETCH 4X1IN

## (undated) DEVICE — TRAY ENT 4/CS

## (undated) DEVICE — SUT ETHILON 3/0 18IN PS-1

## (undated) DEVICE — SPLINT REUTER BIVALVE 0.5MM

## (undated) DEVICE — BANDAGE ESMARK 6X12

## (undated) DEVICE — SUT VICRYL 3-0 27 SH

## (undated) DEVICE — Device

## (undated) DEVICE — TRACKER ENT INSTRUMENT

## (undated) DEVICE — SEE MEDLINE ITEM 156913

## (undated) DEVICE — SPONGE PATTY SURGICAL .5X3IN

## (undated) DEVICE — SUT PLAIN 4-0 SC-1 18IN

## (undated) DEVICE — BLADE SURG #15 CARBON STEEL

## (undated) DEVICE — GAUZE SPONGE 4X4 8 PLY NS

## (undated) DEVICE — SEE MEDLINE ITEM 157150

## (undated) DEVICE — SPONGE GAUZE 16PLY 4X4

## (undated) DEVICE — STOCKINET TUBULAR 1 PLY 6X60IN

## (undated) DEVICE — POWDER ARISTA AH 3G

## (undated) DEVICE — SEE MEDLINE ITEM 152515

## (undated) DEVICE — MARKER SKIN STND TIP BLUE BARR

## (undated) DEVICE — TRAY MINOR ORTHO

## (undated) DEVICE — SUT SILK 3-0 BLK PS-2 18IN